# Patient Record
Sex: MALE | Race: WHITE | NOT HISPANIC OR LATINO | Employment: FULL TIME | URBAN - METROPOLITAN AREA
[De-identification: names, ages, dates, MRNs, and addresses within clinical notes are randomized per-mention and may not be internally consistent; named-entity substitution may affect disease eponyms.]

---

## 2017-02-20 ENCOUNTER — ALLSCRIPTS OFFICE VISIT (OUTPATIENT)
Dept: OTHER | Facility: OTHER | Age: 57
End: 2017-02-20

## 2017-02-20 DIAGNOSIS — I10 ESSENTIAL (PRIMARY) HYPERTENSION: ICD-10-CM

## 2017-02-20 DIAGNOSIS — Z12.5 ENCOUNTER FOR SCREENING FOR MALIGNANT NEOPLASM OF PROSTATE: ICD-10-CM

## 2017-02-20 DIAGNOSIS — E78.5 HYPERLIPIDEMIA: ICD-10-CM

## 2017-09-29 ENCOUNTER — ALLSCRIPTS OFFICE VISIT (OUTPATIENT)
Dept: OTHER | Facility: OTHER | Age: 57
End: 2017-09-29

## 2017-10-27 NOTE — PROGRESS NOTES
Assessment  1  Dizzy (780 4) (R42)   2  Essential hypertension (401 9) (I10)   3  Hyperlipidemia (272 4) (E78 5)    Plan  Dizzy, Essential hypertension, Hyperlipidemia    · (1) CBC/PLT/DIFF; Status:Active; Requested HJS:[id number];    · (1) COMPREHENSIVE METABOLIC PANEL; Status:Active; Requested ZZI:[id number];     Discussion/Summary    He will stop amlodipine for a few days and continue to monitor his blood pressure  He will let us know how he feels without amlodipine  He may restart this at half dose if his blood pressure goes above 150  Chief Complaint  Patient is in the office today complaining of having low blood pressure the past few weeks and he would like to know does it have anything to do with the medications he is taking      History of Present Illness  HPI: Patient comes in because of lightheadedness over the last 2 weeks  He has been checking his blood pressure and has been running in the low 100s  He recently received a new prescription for amlodipine from his mail order pharmacy in the pill looks different  Review of Systems    Constitutional: no fever or chills, feels well, no tiredness, no recent weight loss or weight gain  Cardiovascular: no complaints of slow or fast heart rate, no chest pain, no palpitations, no leg claudication or lower extremity edema  Respiratory: no complaints of shortness of breath, no wheezing or cough, no dyspnea on exertion, no orthopnea or PND  Active Problems  1  Arthropathy (716 90) (M12 9)   2  Essential hypertension (401 9) (I10)   3  GERD (gastroesophageal reflux disease) (530 81) (K21 9)   4  Hyperlipidemia (272 4) (E78 5)   5  Left upper quadrant pain (789 02) (R10 12)   6  Need for influenza vaccination (V04 81) (Z23)   7  Numbness Of The Right Arm (782 0)   8  Numbness Of The Right Leg (782 0)   9  Prostate cancer screening (V76 44) (Z12 5)   10  Screening for colorectal cancer (V76 51) (Z12 11,Z12 12)   11   Visit for preventive health examination (V70 0) (Z00 00)    Past Medical History  1  History of essential hypertension (V12 59) (Z86 79)   2  History of hyperlipidemia (V12 29) (Z86 39)   3  Visit for preventive health examination (V70 0) (Z00 00)  Active Problems And Past Medical History Reviewed: The active problems and past medical history were reviewed and updated today  Family History  Mother    1  Family history of Hypertension  Father    2  Family history of Arthritis   3  Denied: Family history of mental disorder   4  Denied: Family history of substance abuse   5  Family history of Hypercholesterolemia   6  Family history of Hypertension    Social History   · Always uses seat belt   · Being A Social Drinker   · Daily caffeine consumption   · Does not use illicit drugs (Y28 13) (Z78 9)   · Employed   ·    · Never A Smoker   · Seeing a dentist    Current Meds   1  AmLODIPine Besylate 5 MG Oral Tablet; TAKE 1 TABLET DAILY AS DIRECTED    Requested for: 72Khl4382; Last Rx:36Isb1333 Ordered   2  Aspirin 81 MG TABS; Take 2 tabs daily Recorded   3  Simvastatin 20 MG Oral Tablet; TAKE 1 TABLET DAILY; Last Rx:41Onc1546 Ordered   4  Valsartan-Hydrochlorothiazide 160-25 MG Oral Tablet; take one tablet by mouth every   day; Therapy: 73HRQ2420 to (Florentin Gresham)  Requested for: 94Ctu5741; Last   Rx:74Hag9649 Ordered    The medication list was reviewed and updated today  Allergies  1  Diovan TABS   2  Lisinopril TABS   3  Penicillins    Vitals   ** Printed in Appendix #1 below  Physical Exam    Constitutional   General appearance: No acute distress, well appearing and well nourished  Eyes   Conjunctiva and lids: No swelling, erythema, or discharge  Pupils and irises: Equal, round and reactive to light  Ears, Nose, Mouth, and Throat   External inspection of ears and nose: Normal     Otoscopic examination: Tympanic membrance translucent with normal light reflex  Canals patent without erythema      Oropharynx: Normal with no erythema, edema, exudate or lesions  Pulmonary   Respiratory effort: No increased work of breathing or signs of respiratory distress  Auscultation of lungs: Clear to auscultation, equal breath sounds bilaterally, no wheezes, no rales, no rhonci  Cardiovascular   Auscultation of heart: Normal rate and rhythm, normal S1 and S2, without murmurs  Examination of extremities for edema and/or varicosities: Normal     Carotid pulses: Normal     Abdomen   Abdomen: Non-tender, no masses  Lymphatic   Palpation of lymph nodes in neck: No lymphadenopathy  Musculoskeletal   Gait and station: Normal     Digits and nails: Normal without clubbing or cyanosis      Inspection/palpation of joints, bones, and muscles: Normal     Psychiatric   Orientation to person, place and time: Normal     Mood and affect: Normal          Signatures   Electronically signed by : COLLEEN Lorenz ; Sep 29 2017  4:25PM EST                       (Author)    Appendix #1     Patient: Mena Green; : 1960; MRN: [id number]      Recorded: 62PSW2486 04:03PM Recorded: 52BCC3545 04:02PM Recorded: 32VEO2322 03:47PM   Temperature   98 2 F   Heart Rate   80   Respiration   14   Systolic 304, Standing 580, Sitting 326   Diastolic 70, Standing 72, Sitting 74   Height   5 ft 11 in   Weight   206 lb    BMI Calculated   28 73   BSA Calculated   2 14   O2 Saturation   92

## 2017-11-10 ENCOUNTER — ALLSCRIPTS OFFICE VISIT (OUTPATIENT)
Dept: OTHER | Facility: OTHER | Age: 57
End: 2017-11-10

## 2017-11-10 LAB
BILIRUB UR QL STRIP: NEGATIVE
CLARITY UR: NORMAL
COLOR UR: CLEAR
GLUCOSE (HISTORICAL): NEGATIVE
HGB UR QL STRIP.AUTO: NEGATIVE
KETONES UR STRIP-MCNC: NEGATIVE MG/DL
LEUKOCYTE ESTERASE UR QL STRIP: NEGATIVE
NITRITE UR QL STRIP: NEGATIVE
PH UR STRIP.AUTO: 7 [PH]
PROT UR STRIP-MCNC: NEGATIVE MG/DL
SP GR UR STRIP.AUTO: 1.01
UROBILINOGEN UR QL STRIP.AUTO: 0.2

## 2017-11-11 ENCOUNTER — GENERIC CONVERSION - ENCOUNTER (OUTPATIENT)
Dept: OTHER | Facility: OTHER | Age: 57
End: 2017-11-11

## 2017-11-11 NOTE — PROGRESS NOTES
Assessment    1  Dysuria (788 1) (R30 0)   2  Urinary frequency (788 41) (R35 0)   3  Alopecia (704 00) (L65 9)   4  Essential hypertension (401 9) (I10)   5  Hyperlipidemia (272 4) (E78 5)    Plan   Dysuria    · Doxycycline Hyclate 100 MG Oral Capsule; TAKE 1 CAPSULE TWICE DAILYUNTIL GONE  Urine Dip Non-Automated- POC; Status:Resulted - Requires Verification,Retrospective Authorization;   Done: 65FUX9094 12:00AM Due:08Xua3553; Last Updated By:Ruperto Torres; 11/10/2017 4:09:41 PM;Ordered; Raissa Ogden; Ordered By:Zia Sommer; Discussion/Summary    We are getting urine culture we will call with results  We also called results of recent blood work including TSH to look into his hair loss  I have recommend he try off simvastatin for 1 month to see if his hair loss problem improves  Chief Complaint  Patient is in the office today for a possible UTI  Patient states that he has painful and frequent urination, and he also states that he feels as though he has a fever and is have back discomfort  History of Present Illness  HPI: Patient comes in with a 24 hour history of dysuria which actually has improved today  He also complains of a 2 month history of hair loss  He also stop taking amlodipine still has good blood pressure readings at home  Review of Systems   Constitutional: no fever-- and-- no chills  ENT: no complaints of earache, no loss of hearing, no nosebleeds or nasal discharge, no sore throat or hoarseness  Cardiovascular: no complaints of slow or fast heart rate, no chest pain, no palpitations, no leg claudication or lower extremity edema  Respiratory: no complaints of shortness of breath, no wheezing or cough, no dyspnea on exertion, no orthopnea or PND  Active Problems  1  Alopecia (704 00) (L65 9)   2  Arthropathy (716 90) (M12 9)   3  Dizzy (780 4) (R42)   4  Essential hypertension (401 9) (I10)   5  GERD (gastroesophageal reflux disease) (530 81) (K21 9)   6   Hyperlipidemia (272  4) (E78 5)   7  Left upper quadrant pain (789 02) (R10 12)   8  Need for influenza vaccination (V04 81) (Z23)   9  Numbness Of The Right Arm (782 0)   10  Numbness Of The Right Leg (782 0)   11  Prostate cancer screening (V76 44) (Z12 5)   12  Screening for colorectal cancer (V76 51) (Z12 11,Z12 12)   13  Visit for preventive health examination (V70 0) (Z00 00)    Past Medical History  1  History of essential hypertension (V12 59) (Z86 79)   2  History of hyperlipidemia (V12 29) (Z86 39)   3  Visit for preventive health examination (V70 0) (Z00 00)  Active Problems And Past Medical History Reviewed: The active problems and past medical history were reviewed and updated today  Family History  Mother    1  Family history of Hypertension  Father    2  Family history of Arthritis   3  Denied: Family history of mental disorder   4  Denied: Family history of substance abuse   5  Family history of Hypercholesterolemia   6  Family history of Hypertension    Social History     · Always uses seat belt   · Being A Social Drinker   · Daily caffeine consumption   · Does not use illicit drugs (S43 51) (Z78 9)   · Employed   ·    · Never A Smoker   · Seeing a dentist    Current Meds   1  Aspirin 81 MG TABS; Take 2 tabs daily Recorded   2  Simvastatin 20 MG Oral Tablet; TAKE 1 TABLET DAILY; Last Rx:81Rkq7291 Ordered   3  Valsartan-Hydrochlorothiazide 160-25 MG Oral Tablet; take one tablet by mouth every day; Therapy: 31PUP7325 to (Kirsten Campos)  Requested for: 83Slw9139; Last Rx:54Smo6861 Ordered    The medication list was reviewed and updated today  Allergies  1  Diovan TABS   2  Lisinopril TABS   3   Penicillins    Vitals   Recorded: 43HFN6091 02:25PM   Temperature 97 4 F   Heart Rate 85   Respiration 16   Systolic 788   Diastolic 72   Height 5 ft 11 in   Weight 208 lb 6 oz   BMI Calculated 29 06   BSA Calculated 2 15   O2 Saturation 97       Physical Exam   Constitutional  General appearance: No acute distress, well appearing and well nourished  Eyes  Conjunctiva and lids: No swelling, erythema, or discharge  Pupils and irises: Equal, round and reactive to light  Ears, Nose, Mouth, and Throat  External inspection of ears and nose: Normal    Oropharynx: Normal with no erythema, edema, exudate or lesions  Pulmonary  Respiratory effort: No increased work of breathing or signs of respiratory distress  Auscultation of lungs: Clear to auscultation, equal breath sounds bilaterally, no wheezes, no rales, no rhonci  Cardiovascular  Auscultation of heart: Normal rate and rhythm, normal S1 and S2, without murmurs  Examination of extremities for edema and/or varicosities: Normal    Abdomen  Abdomen: Non-tender, no masses  Liver and spleen: No hepatomegaly or splenomegaly  Lymphatic  Palpation of lymph nodes in neck: No lymphadenopathy  Musculoskeletal  Gait and station: Normal    Digits and nails: Normal without clubbing or cyanosis  Inspection/palpation of joints, bones, and muscles: Normal    Skin  Skin and subcutaneous tissue: Abnormal  -- Mild thinning of hair of frontal area  Neurologic  Cranial nerves: Cranial nerves 2-12 intact  Reflexes: 2+ and symmetric  Sensation: No sensory loss     Psychiatric  Orientation to person, place and time: Normal    Mood and affect: Normal          Signatures   Electronically signed by : COLLEEN Cheema ; Nov 10 2017  5:27PM EST                       (Author)

## 2017-11-12 LAB
BACTERIA UR QL AUTO: NORMAL
BILIRUB UR QL STRIP: NEGATIVE
COLOR UR: YELLOW
COMMENT (HISTORICAL): CLEAR
FECAL OCCULT BLOOD DIAGNOSTIC (HISTORICAL): NEGATIVE
GLUCOSE (HISTORICAL): NEGATIVE
KETONES UR STRIP-MCNC: NEGATIVE MG/DL
LEUKOCYTE ESTERASE UR QL STRIP: NEGATIVE
MICROSCOPIC EXAMINATION (HISTORICAL): NORMAL
MICROSCOPIC EXAMINATION (HISTORICAL): NORMAL
MUCUS THREADS (HISTORICAL): PRESENT
NITRITE UR QL STRIP: NEGATIVE
NON-SQ EPI CELLS URNS QL MICRO: NORMAL /HPF
PH UR STRIP.AUTO: 7 [PH] (ref 5–7.5)
PROT UR STRIP-MCNC: NEGATIVE MG/DL
RBC (HISTORICAL): NORMAL /HPF
SP GR UR STRIP.AUTO: 1.01 (ref 1–1.03)
URINALYSIS (UA) (HISTORICAL): NORMAL
UROBILINOGEN UR QL STRIP.AUTO: 0.2 EU/DL (ref 0.2–1)
WBC # BLD AUTO: NORMAL /HPF

## 2017-11-18 ENCOUNTER — GENERIC CONVERSION - ENCOUNTER (OUTPATIENT)
Dept: OTHER | Facility: OTHER | Age: 57
End: 2017-11-18

## 2017-11-19 LAB
T3FREE SERPL-MCNC: 110 NG/DL (ref 71–180)
T4 FREE SERPL-MCNC: 1.4 NG/DL (ref 0.82–1.77)
TSH SERPL DL<=0.05 MIU/L-ACNC: 5.47 UIU/ML (ref 0.45–4.5)

## 2018-01-11 NOTE — PROGRESS NOTES
Assessment    1  Encounter for preventive health examination (V70 0) (Z00 00)   2  Essential hypertension (401 9) (I10)   3  Hyperlipidemia (272 4) (E78 5)    Plan   Essential hypertension    · AmLODIPine Besylate 5 MG Oral Tablet; TAKE 1 TABLET DAILY AS DIRECTED  Essential hypertension, Hyperlipidemia    · (1) CBC/PLT/DIFF; Status:Active; Requested for:94Yht2180;    · (1) COMPREHENSIVE METABOLIC PANEL; Status:Active; Requested for:81Kzd9558;    · (1) LIPID PANEL, FASTING; Status:Active; Requested for:23Ime7640;   Essential hypertension, Hyperlipidemia, Prostate cancer screening    · (1) PSA (SCREEN) (Dx V76 44 Screen for Prostate Cancer); Status:Active; Requested  for:11Sww6814;   Hyperlipidemia    · Simvastatin 20 MG Oral Tablet; TAKE 1 TABLET DAILY    Follow-up visit in 1 year Evaluation and Treatment  Follow-up  Status: Hold For - Scheduling  Requested for: 35Ukp4702  Ordered; For: Health Maintenance;  Ordered By: Stephen Areas  Performed:   Due: 41VBN1891     Discussion/Summary  Impression: health maintenance visit  Currently, he has an adequate exercise regimen  Prostate cancer screening: PSA was ordered  Colorectal cancer screening: colorectal cancer screening is current  Advice and education were given regarding aerobic exercise and weight loss  Self Referrals: No      Chief Complaint  Well visit  History of Present Illness  HM, Adult Male: The patient is being seen for a health maintenance evaluation  The last health maintenance visit was 1 year(s) ago  Social History: Household members include spouse  He is   Work status: working full time  The patient has never smoked cigarettes  He reports occasional alcohol use  General Health: The patient's health since the last visit is described as good  Lifestyle:  He does not exercise regularly  He does not use tobacco  He consumes alcohol  He denies drug use  Screening: cancer screening reviewed and current     metabolic screening reviewed and current  risk screening reviewed and current  HPI: Patient comes in for yearly checkup  Review of Systems    Constitutional: No fever or chills, feels well, no tiredness, no recent weight gain or weight loss  Cardiovascular: No complaints of slow heart rate, no fast heart rate, no chest pain, no palpitations, no leg claudication, no lower extremity  Respiratory: No complaints of shortness of breath, no wheezing, no cough, no SOB on exertion, no orthopnea or PND  Gastrointestinal: No complaints of abdominal pain, no constipation, no nausea or vomiting, no diarrhea or bloody stools  Active Problems    1  Arthropathy (716 90) (M12 9)   2  Essential hypertension (401 9) (I10)   3  GERD (gastroesophageal reflux disease) (530 81) (K21 9)   4  Hyperlipidemia (272 4) (E78 5)   5  Left upper quadrant pain (789 02) (R10 12)   6  Numbness Of The Right Arm (782 0)   7  Numbness Of The Right Leg (782 0)   8  Visit for preventive health examination (V70 0) (Z00 00)    Past Medical History    · History of essential hypertension (V12 59) (Z86 79)   · History of hyperlipidemia (V12 29) (Z86 39)   · Visit for preventive health examination (V70 0) (Z00 00)    Family History  Mother    · Family history of Hypertension  Father    · Family history of Arthritis   · Family history of Hypercholesterolemia   · Family history of Hypertension    Social History    · Always uses seat belt   · Being A Social Drinker   · Daily caffeine consumption   · Does not use illicit drugs (K97 25) (Z78 9)   · Employed   ·    · Never A Smoker   · Seeing a dentist    Current Meds   1  AmLODIPine Besylate 5 MG Oral Tablet; TAKE 1 TABLET DAILY AS DIRECTED    Requested for: 06YUA1421; Last Rx:09Jan2016 Ordered   2  Aspirin 81 MG TABS; Take 2 tabs daily Recorded   3  Simvastatin 20 MG Oral Tablet; TAKE 1 TABLET DAILY; Last Rx:09Jan2016 Ordered   4  Valsartan-Hydrochlorothiazide 160-25 MG Oral Tablet;  Take 1 tablet daily; Therapy: 62MHM8147 to (Evaluate:17Mar2017)  Requested for: 03Bim8566; Last   Rx:31Cmc3422 Ordered    Allergies    1  Diovan TABS   2  Lisinopril TABS   3  Penicillins    Vitals   Recorded: 50Vxh1169 06:01PM   Heart Rate 73   Systolic 361   Diastolic 70   Height 5 ft 11 in   Weight 207 lb    BMI Calculated 28 87   BSA Calculated 2 13   O2 Saturation 98     Physical Exam    Constitutional   General appearance: No acute distress, well appearing and well nourished  Head and Face   Head and face: Normal     Eyes   Conjunctiva and lids: No erythema, swelling or discharge  Pupils and irises: Equal, round, reactive to light  Ears, Nose, Mouth, and Throat   External inspection of ears and nose: Normal     Otoscopic examination: Tympanic membranes translucent with normal light reflex  Canals patent without erythema  Hearing: Normal     Oropharynx: Normal with no erythema, edema, exudate or lesions  Neck   Neck: Supple, symmetric, trachea midline, no masses  Thyroid: Normal, no thyromegaly  Pulmonary   Respiratory effort: No increased work of breathing or signs of respiratory distress  Auscultation of lungs: Clear to auscultation  Cardiovascular   Auscultation of heart: Normal rate and rhythm, normal S1 and S2, no murmurs  Carotid pulses: 2+ bilaterally  Abdominal aorta: Normal     Chest   Chest: Normal     Abdomen   Abdomen: Non-tender, no masses  Liver and spleen: No hepatomegaly or splenomegaly  Lymphatic   Palpation of lymph nodes in neck: No lymphadenopathy  Palpation of lymph nodes in other areas: No lymphadenopathy  Musculoskeletal   Gait and station: Normal     Inspection/palpation of digits and nails: Normal without clubbing or cyanosis  Inspection/palpation of joints, bones, and muscles: Normal     Skin   Palpation of skin and subcutaneous tissue: Normal turgor  Neurologic   Cranial nerves: Cranial nerves 2-12 intact  Reflexes: 2+ and symmetric      Sensation: No sensory loss      Psychiatric   Judgment and insight: Normal     Orientation to person, place and time: Normal     Mood and affect: Normal        Signatures   Electronically signed by : COLLEEN Mccarthy ; Feb 20 2017  6:20PM EST                       (Author)

## 2018-01-13 VITALS
HEIGHT: 71 IN | RESPIRATION RATE: 14 BRPM | OXYGEN SATURATION: 92 % | HEART RATE: 80 BPM | WEIGHT: 206 LBS | SYSTOLIC BLOOD PRESSURE: 112 MMHG | DIASTOLIC BLOOD PRESSURE: 70 MMHG | BODY MASS INDEX: 28.84 KG/M2 | TEMPERATURE: 98.2 F

## 2018-01-14 VITALS
WEIGHT: 207 LBS | SYSTOLIC BLOOD PRESSURE: 110 MMHG | OXYGEN SATURATION: 98 % | BODY MASS INDEX: 28.98 KG/M2 | DIASTOLIC BLOOD PRESSURE: 70 MMHG | HEIGHT: 71 IN | HEART RATE: 73 BPM

## 2018-01-14 VITALS
OXYGEN SATURATION: 97 % | SYSTOLIC BLOOD PRESSURE: 118 MMHG | HEIGHT: 71 IN | TEMPERATURE: 97.4 F | RESPIRATION RATE: 16 BRPM | DIASTOLIC BLOOD PRESSURE: 72 MMHG | WEIGHT: 208.38 LBS | BODY MASS INDEX: 29.17 KG/M2 | HEART RATE: 85 BPM

## 2018-01-16 NOTE — PROGRESS NOTES
Assessment    1  Essential hypertension (401 9) (I10)   2  Hyperlipidemia (272 4) (E78 5)   3  Left upper quadrant pain (789 02) (R10 12)    Plan  Essential hypertension    · AmLODIPine Besylate 5 MG Oral Tablet; TAKE 1 TABLET DAILY AS DIRECTED   · Triamterene-HCTZ 37 5-25 MG Oral Capsule (Dyazide); TAKE 1 CAPSULE Daily  Health Maintenance    · Follow-up visit in 1 year Evaluation and Treatment  Follow-up  Status: Complete  Done:  28SJB8968  Health Maintenance, Hyperlipidemia    · (1) CBC/PLT/DIFF; Status:Active; Requested UJK:94KTA6279;    · (1) COMPREHENSIVE METABOLIC PANEL; Status:Active; Requested WTJ:28AHD4316;    · (1) LIPID PANEL, FASTING; Status:Active; Requested UI61CKP1042;   the patient been fasting for 10-12 hours? : Yes   · (1) PSA (SCREEN) (Dx V76 44 Screen for Prostate Cancer); Status:Active; Requested  UFK:74FCK9613;   Hyperlipidemia    · Simvastatin 20 MG Oral Tablet; TAKE 1 TABLET DAILY    Discussion/Summary    Follow up with GI regarding his abdominal pain  The treatment plan was reviewed with the patient/guardian  The patient/guardian understands and agrees with the treatment plan      Chief Complaint  Pt is here for a follow up, Check finger from post infection      History of Present Illness  Patient comes in for yearly checkup  He is seeing GI for left upper part of abdominal pain and bloating  He had normal colonoscopy last week  He monitors blood pressure at home  Review of Systems    Constitutional: No fever or chills, feels well, no tiredness, no recent weight gain or weight loss  Cardiovascular: No complaints of slow heart rate, no fast heart rate, no chest pain, no palpitations, no leg claudication, no lower extremity  Respiratory: No complaints of shortness of breath, no wheezing, no cough, no SOB on exertion, no orthopnea or PND  Gastrointestinal: as noted in HPI  Active Problems    1  Arthropathy (716 90) (M12 9)   2  Essential hypertension (401 9) (I10)   3  GERD (gastroesophageal reflux disease) (530 81) (K21 9)   4  Hyperlipidemia (272 4) (E78 5)   5  Numbness Of The Right Arm (782 0)   6  Numbness Of The Right Leg (782 0)   7  Visit for preventive health examination (V70 0) (Z00 00)    Past Medical History    1  History of essential hypertension (V12 59) (Z86 79)   2  History of hyperlipidemia (V12 29) (Z86 39)   3  Visit for preventive health examination (V70 0) (Z00 00)    Family History    1  Family history of Hypertension    2  Family history of Arthritis   3  Family history of Hypercholesterolemia   4  Family history of Hypertension    The family history was reviewed and updated today  Social History    · Being A Social Drinker   · Never A Smoker  The social history was reviewed and updated today  The social history was reviewed and is unchanged  Current Meds   1  AmLODIPine Besylate 5 MG Oral Tablet; TAKE 1 TABLET DAILY AS DIRECTED    Requested for: 20HKJ1718; Last OP:[id number] Ordered   2  Aspirin 81 MG Oral Tablet; Take 2 tabs daily Recorded   3  Dyazide 37 5-25 MG Oral Capsule; TAKE 1 CAPSULE Daily Recorded   4  Simvastatin 20 MG Oral Tablet; TAKE 1 TABLET DAILY; Last YA:06DMQ2589 Ordered    The medication list was reviewed and updated today  Allergies    1  Diovan TABS   2  Lisinopril TABS   3  Penicillins    Vitals  Vital Signs [Data Includes: Current Encounter]    Recorded: 39UAZ6732 09:23AM   Heart Rate 71   Systolic 016   Diastolic 64   Height 5 ft 11 in   Weight 204 lb    BMI Calculated 28 45   BSA Calculated 2 12   O2 Saturation 98     Physical Exam    Constitutional   General appearance: No acute distress, well appearing and well nourished  Eyes   Conjunctiva and lids: No swelling, erythema, or discharge  Pupils and irises: Equal, round and reactive to light  Ears, Nose, Mouth, and Throat   External inspection of ears and nose: Normal     Otoscopic examination: Tympanic membrance translucent with normal light reflex   Canals patent without erythema  Oropharynx: Normal with no erythema, edema, exudate or lesions  Pulmonary   Respiratory effort: No increased work of breathing or signs of respiratory distress  Auscultation of lungs: Clear to auscultation, equal breath sounds bilaterally, no wheezes, no rales, no rhonci  Cardiovascular   Auscultation of heart: Normal rate and rhythm, normal S1 and S2, without murmurs  Examination of extremities for edema and/or varicosities: Normal     Carotid pulses: Normal     Abdomen   Abdomen: Non-tender, no masses  Liver and spleen: No hepatomegaly or splenomegaly  Lymphatic   Palpation of lymph nodes in neck: No lymphadenopathy  Musculoskeletal   Gait and station: Normal     Digits and nails: Normal without clubbing or cyanosis  Inspection/palpation of joints, bones, and muscles: Normal     Skin   Skin and subcutaneous tissue: Normal without rashes or lesions  Neurologic   Cranial nerves: Cranial nerves 2-12 intact  Reflexes: 2+ and symmetric  Sensation: No sensory loss      Psychiatric   Orientation to person, place and time: Normal     Mood and affect: Normal          Signatures   Electronically signed by : COLLEEN Escudero ; Jan 9 2016 10:53AM EST                       (Author)

## 2018-10-01 PROBLEM — I10 ESSENTIAL HYPERTENSION: Status: ACTIVE | Noted: 2018-04-19

## 2018-10-01 PROBLEM — E78.2 MIXED HYPERLIPIDEMIA: Status: ACTIVE | Noted: 2018-08-30

## 2018-10-01 PROBLEM — E04.1 THYROID NODULE: Status: ACTIVE | Noted: 2018-02-27

## 2018-10-01 PROBLEM — E03.9 ACQUIRED HYPOTHYROIDISM: Status: ACTIVE | Noted: 2018-02-27

## 2018-10-08 ENCOUNTER — OFFICE VISIT (OUTPATIENT)
Dept: INTERNAL MEDICINE CLINIC | Facility: CLINIC | Age: 58
End: 2018-10-08
Payer: COMMERCIAL

## 2018-10-08 VITALS
WEIGHT: 212 LBS | SYSTOLIC BLOOD PRESSURE: 136 MMHG | DIASTOLIC BLOOD PRESSURE: 88 MMHG | OXYGEN SATURATION: 96 % | BODY MASS INDEX: 31.4 KG/M2 | HEART RATE: 62 BPM | HEIGHT: 69 IN

## 2018-10-08 DIAGNOSIS — E66.9 OBESITY (BMI 30-39.9): ICD-10-CM

## 2018-10-08 DIAGNOSIS — I10 ESSENTIAL HYPERTENSION: ICD-10-CM

## 2018-10-08 DIAGNOSIS — E78.2 MIXED HYPERLIPIDEMIA: ICD-10-CM

## 2018-10-08 DIAGNOSIS — Z13.31 DEPRESSION SCREENING NEGATIVE: ICD-10-CM

## 2018-10-08 DIAGNOSIS — E03.9 ACQUIRED HYPOTHYROIDISM: Primary | ICD-10-CM

## 2018-10-08 DIAGNOSIS — Z71.3 DIETARY COUNSELING AND SURVEILLANCE: ICD-10-CM

## 2018-10-08 PROCEDURE — 1036F TOBACCO NON-USER: CPT | Performed by: INTERNAL MEDICINE

## 2018-10-08 PROCEDURE — 99214 OFFICE O/P EST MOD 30 MIN: CPT | Performed by: INTERNAL MEDICINE

## 2018-10-08 RX ORDER — IRBESARTAN 75 MG/1
75 TABLET ORAL
COMMUNITY
End: 2018-10-08 | Stop reason: SDUPTHER

## 2018-10-08 RX ORDER — MULTIVITAMIN
1 TABLET ORAL DAILY
COMMUNITY

## 2018-10-08 RX ORDER — IRBESARTAN 75 MG/1
75 TABLET ORAL
Qty: 30 TABLET | Refills: 0 | Status: SHIPPED | OUTPATIENT
Start: 2018-10-08 | End: 2018-11-10 | Stop reason: SDUPTHER

## 2018-10-08 NOTE — PROGRESS NOTES
INTERNAL MEDICINE INITIAL OFFICE VISIT  [facility] - [facility] [address] [place] [address]    NAME: Raheem Jennings  AGE: 62 y o  SEX: male  : 1960     DATE: 10/8/2018     Assessment and Plan:     1  Acquired hypothyroidism    Will repeat thyroid function testing within the next month  Based on those results will see if we need to adjust levothyroxine     - TSH, 3rd generation; Future  - T4, free; Future  - T3; Future    2  Essential hypertension    Blood pressure is stable  Continue avapro as prescribed  Discussed diet and exercise  Weight loss is encouraged  - irbesartan (AVAPRO) 75 mg tablet; Take 1 tablet (75 mg total) by mouth daily at bedtime  Dispense: 30 tablet; Refill: 0    3  Mixed hyperlipidemia    Continue statin  LDL goal <100  Will repeat lipid panel before next appt  - Lipid panel; Future    4  Obesity (BMI 30-39  9)    Patient's Body mass index is 31 08 kg/m²  Discussed the patient's BMI  The BMI is above average; BMI counseling and education was provided to the patient  General weight loss/lifestyle modification strategies discussed (elicit support from others; identify saboteurs; non-food rewards, etc)  Diet interventions: low carb vs atkins vs keto vs DASH diet discussed  Regular aerobic exercise program was recommended at least 3 times per week     Chief Complaint:     Chief Complaint   Patient presents with    Establish Care      History of Present Illness:     Patient presents to establish care  He has underlying hypertension, hyperlipidemia, hypothyroidism, and obesity  He presents with his wife  Has no acute concerns  Admits to not watching his diet and never exercises  His most recent TSH was elevated at 7 700  His previous PCP elected to monitor on current dose of levothyroxine for now  He has been able to lose weight when he tries to watch what he is eating  His valsartan was switched to irbesartan due to valsartan recall   He denies any current cardiac symptoms  He is tolerating statin well without myalgias  Does not want flu shot  The following portions of the patient's history were reviewed and updated as appropriate: allergies, current medications, past family history, past medical history, past social history, past surgical history and problem list      Review of Systems:     Review of Systems   Constitutional: Negative for appetite change, chills, fatigue and fever  HENT: Negative for congestion, hearing loss, postnasal drip, rhinorrhea, sore throat, tinnitus and trouble swallowing  Eyes: Negative for pain, discharge, redness and visual disturbance  Respiratory: Negative for cough, chest tightness, shortness of breath and wheezing  Cardiovascular: Negative for chest pain, palpitations and leg swelling  Gastrointestinal: Negative for abdominal distention, abdominal pain, blood in stool, constipation, diarrhea, nausea and vomiting  Endocrine: Negative for cold intolerance, heat intolerance, polydipsia, polyphagia and polyuria  Genitourinary: Negative for difficulty urinating, dysuria, frequency, hematuria and urgency  Musculoskeletal: Negative for arthralgias, back pain, gait problem, joint swelling, myalgias, neck pain and neck stiffness  Skin: Negative for color change and rash  Neurological: Negative for dizziness, tremors, seizures, syncope, speech difficulty, weakness, light-headedness, numbness and headaches  Hematological: Negative for adenopathy  Does not bruise/bleed easily  Psychiatric/Behavioral: Negative for agitation, behavioral problems, confusion, hallucinations, sleep disturbance and suicidal ideas  The patient is not nervous/anxious  Past Medical History:     Past Medical History:   Diagnosis Date    Essential hypertension     GERD (gastroesophageal reflux disease)     Hyperlipidemia     Hypothyroidism     Thyroid nodule       Past Surgical History:   History reviewed   No pertinent surgical history  Social History:   He reports that he has never smoked  He has never used smokeless tobacco  He reports that he drinks alcohol  He reports that he does not use drugs  Family History:     Family History   Problem Relation Age of Onset    Hypertension Mother     Arthritis Father     Hyperlipidemia Father     Hypertension Father     No Known Problems Sister       Current Medications:     Current Outpatient Prescriptions:     aspirin 81 MG tablet, Take 2 tablets by mouth daily, Disp: , Rfl:     irbesartan (AVAPRO) 75 mg tablet, Take 75 mg by mouth daily at bedtime, Disp: , Rfl:     levothyroxine 50 mcg tablet, TK 1 T PO D, Disp: , Rfl: 3    Multiple Vitamin (MULTIVITAMIN) tablet, Take 1 tablet by mouth daily, Disp: , Rfl:     simvastatin (ZOCOR) 20 mg tablet, Take 1 tablet by mouth daily, Disp: , Rfl:      Allergies: Allergies   Allergen Reactions    Lisinopril Cough    Penicillins       Physical Exam:     /88   Pulse 62   Ht 5' 9 25" (1 759 m)   Wt 96 2 kg (212 lb)   SpO2 96%   BMI 31 08 kg/m²     Physical Exam   Constitutional: He is oriented to person, place, and time  He appears well-developed and well-nourished  No distress  Obesity   Eyes: Conjunctivae are normal  Right eye exhibits no discharge  Left eye exhibits no discharge  No scleral icterus  Neck: Neck supple  No JVD present  No thyromegaly present  Cardiovascular: Normal rate, regular rhythm, normal heart sounds and intact distal pulses  Exam reveals no gallop and no friction rub  No murmur heard  Pulmonary/Chest: Effort normal and breath sounds normal  No respiratory distress  He has no wheezes  He has no rales  He exhibits no tenderness  Abdominal: Soft  Bowel sounds are normal  He exhibits no distension and no mass  There is no tenderness  There is no rebound and no guarding  Musculoskeletal: Normal range of motion  He exhibits no edema  Lymphadenopathy:     He has no cervical adenopathy  Neurological: He is alert and oriented to person, place, and time  Skin: Skin is warm and dry  He is not diaphoretic  Psychiatric: He has a normal mood and affect  His behavior is normal    Vitals reviewed       Data:     Laboratory Results: I have personally reviewed the pertinent laboratory results/reports     PHQ-9  Negative for depression with PHQ2 score of 0     Yoana Griffin DO  MEDICAL [id number]

## 2018-10-08 NOTE — PATIENT INSTRUCTIONS
Influenza Vaccine   AMBULATORY CARE:   The influenza vaccine  is an injection given to help prevent influenza (flu)  The flu is caused by a virus  The virus spreads from person to person through coughing and sneezing  Several types of viruses cause the flu  The viruses change over time, so new vaccines are made each year  The vaccine begins to protect you about 2 weeks after you get it  The flu shot usually injected into your upper arm  It may be given in your thigh  You may get a vaccine with a weak or dead virus  Call 911 for any of the following:   · Your mouth and throat are swollen  · You are wheezing or have trouble breathing  · You have chest pain or your heart is beating faster than normal for you  · You feel like you are going to faint  Seek care immediately if:   · Your face is red or swollen  · You have hives that spread over your body  · You feel weak or dizzy  Contact your healthcare provider if:   · You have increased pain, redness, or swelling around the area where the shot was given  · You have questions or concerns about the influenza vaccine  When to get the influenza vaccine: The influenza vaccine is offered every year starting in September or October  Get the influenza vaccine as soon as it is available  Children 6 months to 6years old need 2 doses during the first year they get the vaccine  The 2 doses should be given at least 4 weeks apart  It is best if the same type of vaccine is given both times  The child can then receive 1 dose each year  Children 9 years or older should get 1 dose each year          Who should get the flu shot:   · Infants 6 months or older    · Any healthy adult who would like to decrease the risk for the flu    · Anyone living with or caring for children younger than 5 years     · Healthcare workers    · Anyone who lives in a long-term care facility    · Anyone who has chronic health problems, such as asthma, diabetes, or blood disorders    · Anyone who has a weak immune system    · Women who are or will be pregnant during the flu season  Who should not get the flu shot:  If you have an egg allergy, ask your healthcare provider if it is safe to get the flu shot  You will need to be closely monitored by a healthcare provider while you receive the vaccine, and for an hour or more after  The following should not get the flu shot:  · Infants younger than 6 months     · Anyone who has had an allergic reaction to the flu shot    · Anyone who is sick or has a fever    · Anyone who received a diagnosis of Guillain-Barré syndrome within 6 weeks of getting a flu vaccine    · Anyone who is allergic to thimerosal (mercury)  Risks of the influenza vaccine: The flu shot may cause mild symptoms, such as a fever, headache, and muscle aches  It may also cause mild to moderate soreness or redness at the area where you were given the shot  The nasal spray may cause a fever, runny or stuffy nose, headache, muscle aches, or vomiting  You may still get the flu after you receive the influenza vaccine  If you are allergic to eggs, ask about an egg-free vaccine  You may have an allergic reaction to the vaccine  This can be life-threatening  Follow up with your healthcare provider as directed:  Write down your questions so you remember to ask them during your visits  © 2017 [address] [employer] Information is for End User's use only and may not be sold, redistributed or otherwise used for commercial purposes  All illustrations and images included in CareNotes® are the copyrighted property of A D [employer] M [employer]  or [employer] Crist[employer]  The above information is an  only  It is not intended as medical advice for individual conditions or treatments  Talk to your doctor, nurse or pharmacist before following any medical regimen to see if it is safe and effective for you

## 2018-11-10 DIAGNOSIS — I10 ESSENTIAL HYPERTENSION: ICD-10-CM

## 2018-11-10 RX ORDER — IRBESARTAN 75 MG/1
TABLET ORAL
Qty: 30 TABLET | Refills: 5 | Status: SHIPPED | OUTPATIENT
Start: 2018-11-10 | End: 2019-01-02 | Stop reason: SDUPTHER

## 2018-11-27 ENCOUNTER — TELEPHONE (OUTPATIENT)
Dept: INTERNAL MEDICINE CLINIC | Facility: CLINIC | Age: 58
End: 2018-11-27

## 2018-11-27 DIAGNOSIS — E03.9 ACQUIRED HYPOTHYROIDISM: Primary | ICD-10-CM

## 2018-11-27 LAB
CHOLEST SERPL-MCNC: 154 MG/DL (ref 100–199)
HDLC SERPL-MCNC: 50 MG/DL
LABCORP COMMENT: NORMAL
LDLC SERPL CALC-MCNC: 86 MG/DL (ref 0–99)
SL AMB VLDL CHOLESTEROL CALC: 18 MG/DL (ref 5–40)
T3 SERPL-MCNC: 97 NG/DL (ref 71–180)
T4 FREE SERPL-MCNC: 1.43 NG/DL (ref 0.82–1.77)
TRIGL SERPL-MCNC: 92 MG/DL (ref 0–149)
TSH SERPL DL<=0.005 MIU/L-ACNC: 5.21 UIU/ML (ref 0.45–4.5)

## 2018-11-27 RX ORDER — LEVOTHYROXINE SODIUM 0.07 MG/1
75 TABLET ORAL DAILY
Qty: 90 TABLET | Refills: 1 | Status: SHIPPED | OUTPATIENT
Start: 2018-11-27 | End: 2018-11-28 | Stop reason: SDUPTHER

## 2018-11-27 NOTE — TELEPHONE ENCOUNTER
----- Message from Brando Muñoz DO sent at 11/27/2018  9:55 AM EST -----  TSH slightly elevated still  Would like to increase levothyroxine to 75mcg daily

## 2018-11-28 ENCOUNTER — TELEPHONE (OUTPATIENT)
Dept: INTERNAL MEDICINE CLINIC | Facility: CLINIC | Age: 58
End: 2018-11-28

## 2018-11-28 DIAGNOSIS — E03.9 ACQUIRED HYPOTHYROIDISM: ICD-10-CM

## 2018-11-28 RX ORDER — LEVOTHYROXINE SODIUM 0.07 MG/1
75 TABLET ORAL DAILY
Qty: 90 TABLET | Refills: 1 | Status: SHIPPED | OUTPATIENT
Start: 2018-11-28 | End: 2019-07-25 | Stop reason: SDUPTHER

## 2018-11-28 RX ORDER — LEVOTHYROXINE SODIUM 0.07 MG/1
75 TABLET ORAL DAILY
Qty: 30 TABLET | Refills: 0 | Status: SHIPPED | OUTPATIENT
Start: 2018-11-28 | End: 2018-11-28 | Stop reason: SDUPTHER

## 2018-11-28 NOTE — TELEPHONE ENCOUNTER
PATIENT WENT TO [facility] LAST NIGHT AND DID NOT HAVE THE MONEY FOR HIS 80 DAY SUPPLY OF HIS LEVOTHYROXINE 75 MCG  HE IS REQUESTING A 30 DAY SUPPLY OF LEVOTHYROXINE 75 MCG TABLET SENT TO [facility] AND A 90 DAY SUPPLY THROUGH [facility]   PLEASE CONTACT PATIENT WITH ANY QUESTIONS   [phone]

## 2018-12-07 RX ORDER — SIMVASTATIN 20 MG
TABLET ORAL DAILY
Qty: 90 TABLET | Refills: 0 | OUTPATIENT
Start: 2018-12-07

## 2018-12-19 DIAGNOSIS — E78.2 MIXED HYPERLIPIDEMIA: Primary | ICD-10-CM

## 2018-12-19 RX ORDER — SIMVASTATIN 20 MG
20 TABLET ORAL DAILY
Qty: 90 TABLET | Refills: 3 | Status: SHIPPED | OUTPATIENT
Start: 2018-12-19 | End: 2019-10-07 | Stop reason: SDUPTHER

## 2019-01-02 ENCOUNTER — OFFICE VISIT (OUTPATIENT)
Dept: INTERNAL MEDICINE CLINIC | Facility: CLINIC | Age: 59
End: 2019-01-02
Payer: COMMERCIAL

## 2019-01-02 VITALS
WEIGHT: 217.4 LBS | DIASTOLIC BLOOD PRESSURE: 70 MMHG | SYSTOLIC BLOOD PRESSURE: 128 MMHG | HEART RATE: 77 BPM | OXYGEN SATURATION: 92 % | HEIGHT: 69 IN | BODY MASS INDEX: 32.2 KG/M2

## 2019-01-02 DIAGNOSIS — E03.9 ACQUIRED HYPOTHYROIDISM: ICD-10-CM

## 2019-01-02 DIAGNOSIS — K21.9 GASTROESOPHAGEAL REFLUX DISEASE WITHOUT ESOPHAGITIS: ICD-10-CM

## 2019-01-02 DIAGNOSIS — M25.50 ARTHRALGIA, UNSPECIFIED JOINT: ICD-10-CM

## 2019-01-02 DIAGNOSIS — I10 ESSENTIAL HYPERTENSION: Primary | ICD-10-CM

## 2019-01-02 DIAGNOSIS — R53.83 FATIGUE, UNSPECIFIED TYPE: ICD-10-CM

## 2019-01-02 PROCEDURE — 3078F DIAST BP <80 MM HG: CPT | Performed by: INTERNAL MEDICINE

## 2019-01-02 PROCEDURE — 99214 OFFICE O/P EST MOD 30 MIN: CPT | Performed by: INTERNAL MEDICINE

## 2019-01-02 PROCEDURE — 1036F TOBACCO NON-USER: CPT | Performed by: INTERNAL MEDICINE

## 2019-01-02 PROCEDURE — 3008F BODY MASS INDEX DOCD: CPT | Performed by: INTERNAL MEDICINE

## 2019-01-02 PROCEDURE — 3074F SYST BP LT 130 MM HG: CPT | Performed by: INTERNAL MEDICINE

## 2019-01-02 RX ORDER — IRBESARTAN 150 MG/1
150 TABLET ORAL
Qty: 90 TABLET | Refills: 1 | Status: SHIPPED | OUTPATIENT
Start: 2019-01-02 | End: 2019-02-19

## 2019-01-02 RX ORDER — PANTOPRAZOLE SODIUM 40 MG/1
40 TABLET, DELAYED RELEASE ORAL DAILY
Qty: 30 TABLET | Refills: 0 | Status: SHIPPED | OUTPATIENT
Start: 2019-01-02 | End: 2019-12-03 | Stop reason: SDUPTHER

## 2019-01-02 NOTE — PATIENT INSTRUCTIONS
Gastroesophageal Reflux Disease   AMBULATORY CARE:   Gastroesophageal reflux  reflux occurs when acid and food in the stomach back up into the esophagus  Gastroesophageal reflux disease (GERD) is reflux that occurs more than twice a week for a few weeks  It usually causes heartburn and other symptoms  GERD can cause other health problems over time if it is not treated  Common symptoms include:  Heartburn is the most common symptom of GERD  You may feel burning pain in your chest or below the breast bone  This usually occurs after meals and spreads to your neck, jaw, or shoulder  The pain gets better when you change positions  You may also have any of the following:  · Bitter or acid taste in your mouth    · Dry cough    · Trouble swallowing or pain with swallowing    · Hoarseness or sore throat    · Frequent burping or hiccups    · Feeling of fullness soon after you start eating  Seek care immediately if:  · You feel full and cannot burp or vomit  · You have severe chest pain and sudden trouble breathing  · Your bowel movements are black, bloody, or tarry-looking  · Your vomit looks like coffee grounds or has blood in it  Contact your healthcare provider if:   · You vomit large amounts, or you vomit often  · You have trouble breathing after you vomit  · You have trouble swallowing, or pain with swallowing  · You are losing weight without trying  · Your symptoms get worse or do not improve with treatment  · You have questions or concerns about your condition or care  Treatment for GERD:  Your healthcare provider may prescribe medicine to decrease stomach acid  He may also prescribe medicine that help your esophagus and stomach move food and liquid to your intestines  Surgery may be done if other treatments do not work  You may need surgery to wrap the upper part of the stomach around the esophageal sphincter  This will strengthen the sphincter and prevent reflux     Manage GERD: · Do not have foods or drinks that may increase heartburn  These include chocolate, peppermint, fried or fatty foods, drinks that contain caffeine, or carbonated drinks (soda)  Other foods include spicy foods, onions, tomatoes, and tomato-based foods  Do not have foods or drinks that can irritate your esophagus, such as citrus fruits, juices, and alcohol  · Do not eat large meals  When you eat a lot of food at one time, your stomach needs more acid to digest it  Eat 6 small meals each day instead of 3 large ones, and eat slowly  Do not eat meals 2 to 3 hours before bedtime  · Elevate the head of your bed  Place 6-inch blocks under the head of your bed frame  You may also use more than one pillow under your head and shoulders while you sleep  · Maintain a healthy weight  If you are overweight, weight loss may help relieve symptoms of GERD  · Do not smoke  Smoking weakens the lower esophageal sphincter and increases the risk of GERD  Ask your healthcare provider for information if you currently smoke and need help to quit  E-cigarettes or smokeless tobacco still contain nicotine  Talk to your healthcare provider before you use these products  · Do not wear clothing that is tight around your waist   Tight clothing can put pressure on your stomach and cause or worsen GERD symptoms  Follow up with your healthcare provider as directed:  Write down your questions so you remember to ask them during your visits  © 2017 [facility] Information is for End User's use only and may not be sold, redistributed or otherwise used for commercial purposes  All illustrations and images included in CareNotes® are the copyrighted property of A D A M , Inc  or [employer]  The above information is an  only  It is not intended as medical advice for individual conditions or treatments   Talk to your doctor, nurse or pharmacist before following any medical regimen to see if it is safe and effective for you

## 2019-01-02 NOTE — PROGRESS NOTES
INTERNAL MEDICINE FOLLOW-UP OFFICE VISIT  [facility] - [facility]    NAME: Alfonso Palomo  AGE: 62 y o  SEX: male  : 1960     DATE: 2019     Assessment and Plan:     1  Essential hypertension    Increase avapro to 150 mg daily  Heart healthy diet  Increase exercise  BMI Counseling: Body mass index is 31 87 kg/m²  Discussed the patient's BMI with him  The BMI is above average  BMI counseling and education was provided to the patient  Nutrition recommendations include reducing portion sizes, decreasing overall calorie intake and 3-5 servings of fruits/vegetables daily  Exercise recommendations include moderate aerobic physical activity for 150 minutes/week and exercising 3-5 times per week  - irbesartan (AVAPRO) 150 mg tablet; Take 1 tablet (150 mg total) by mouth daily at bedtime  Dispense: 90 tablet; Refill: 1    2  Gastroesophageal reflux disease without esophagitis    Recommend 2-4 week course of PPI  - pantoprazole (PROTONIX) 40 mg tablet; Take 1 tablet (40 mg total) by mouth daily  Dispense: 30 tablet; Refill: 0    3  Arthralgia, unspecified joint  4  Fatigue, unspecified type    No joint swelling on exam  Will check lyme antibody profile  - Lyme Antibody Profile with reflex to WB; Future    5  Acquired hypothyroidism    Repeat TSH in 2 weeks as dose was recently increased  - TSH, 3rd generation; Future    Return in about 6 months (around 2019) for Follow-up  Chief Complaint:     Chief Complaint   Patient presents with    Hypertension     out of control      History of Present Illness:     Patient has been monitoring his blood pressure at home and has had some spikes in the systolic range between 396X to 150s  Patient has been having some increased indigestion and pains in his joints with should been worrying him  Patient is not sure if anxiety has been raising his blood pressure    Patient noted some increased headaches when his blood pressure is high  Patient denies any vision changes, chest pain, shortness of breath  Patient is concerned with his migratory joint pain that he could have underlying Lyme  He denies any joint swelling  The following portions of the patient's history were reviewed and updated as appropriate: allergies, current medications, past family history, past medical history, past social history, past surgical history and problem list      Review of Systems:     Review of Systems   Constitutional: Negative for activity change, appetite change and fatigue  Respiratory: Negative for apnea, cough, chest tightness, shortness of breath and wheezing  Cardiovascular: Negative for chest pain, palpitations and leg swelling  Gastrointestinal: Negative for abdominal distention, abdominal pain, blood in stool, constipation, diarrhea, nausea and vomiting  GERD   Musculoskeletal: Positive for arthralgias  Negative for back pain, gait problem, joint swelling and myalgias  Skin: Negative for rash and wound  Neurological: Negative for dizziness, weakness, light-headedness, numbness and headaches  Psychiatric/Behavioral: Negative for behavioral problems, confusion, hallucinations, sleep disturbance and suicidal ideas  The patient is nervous/anxious  Problem List:     Patient Active Problem List   Diagnosis    Acquired hypothyroidism    Essential hypertension    GERD (gastroesophageal reflux disease)    Mixed hyperlipidemia    Thyroid nodule      Objective:     /70 (BP Location: Left arm, Patient Position: Sitting, Cuff Size: Standard)   Pulse 77   Ht 5' 9 25" (1 759 m)   Wt 98 6 kg (217 lb 6 4 oz)   SpO2 92%   BMI 31 87 kg/m²     Physical Exam   Constitutional: He is oriented to person, place, and time  He appears well-developed and well-nourished  No distress  Eyes: Conjunctivae are normal  Right eye exhibits no discharge  Left eye exhibits no discharge  No scleral icterus     Neck: Neck supple  No JVD present  No thyromegaly present  Cardiovascular: Normal rate, regular rhythm and normal heart sounds  No murmur heard  Pulmonary/Chest: Effort normal and breath sounds normal  No respiratory distress  He has no wheezes  He has no rales  He exhibits no tenderness  Abdominal: Soft  Bowel sounds are normal  He exhibits no distension and no mass  There is no tenderness  There is no rebound and no guarding  No hernia  Musculoskeletal: He exhibits tenderness (Increased tenderness over bilateral anterior shoulders) and deformity (crepitus of left knee)  He exhibits no edema  Lymphadenopathy:     He has no cervical adenopathy  Neurological: He is alert and oriented to person, place, and time  Skin: Skin is warm and dry  He is not diaphoretic  Psychiatric: He has a normal mood and affect  His behavior is normal    Vitals reviewed      Elpidio Liz DO  [facility]

## 2019-01-03 ENCOUNTER — TELEPHONE (OUTPATIENT)
Dept: INTERNAL MEDICINE CLINIC | Facility: CLINIC | Age: 59
End: 2019-01-03

## 2019-01-03 NOTE — TELEPHONE ENCOUNTER
Spoke with patient and pharmacy  They are giving him what they have left which was 40 pills until they get a new shipment in  Patient was notified and will be picking up his prescription

## 2019-01-03 NOTE — TELEPHONE ENCOUNTER
[facility] CALLED AND SAID THE IRBESARTAN 150MG IS ON BACKORDER  CAN YOU ORDER AN ALTERNATIVE    [facility]    [phone]

## 2019-01-21 ENCOUNTER — TELEPHONE (OUTPATIENT)
Dept: INTERNAL MEDICINE CLINIC | Facility: CLINIC | Age: 59
End: 2019-01-21

## 2019-01-21 LAB
B BURGDOR IGG+IGM SER-ACNC: <0.91 ISR (ref 0–0.9)
B BURGDOR IGM SER IA-ACNC: <0.8 INDEX (ref 0–0.79)
TSH SERPL DL<=0.005 MIU/L-ACNC: 3.68 UIU/ML (ref 0.45–4.5)

## 2019-01-21 NOTE — TELEPHONE ENCOUNTER
----- Message from Yoana Griffin DO sent at 1/21/2019  4:55 PM EST -----  Call patient and let him know that I reviewed their recent laboratory testing and labs were normal

## 2019-02-19 ENCOUNTER — TELEPHONE (OUTPATIENT)
Dept: INTERNAL MEDICINE CLINIC | Facility: CLINIC | Age: 59
End: 2019-02-19

## 2019-02-19 DIAGNOSIS — I10 ESSENTIAL HYPERTENSION: Primary | ICD-10-CM

## 2019-02-19 RX ORDER — VALSARTAN AND HYDROCHLOROTHIAZIDE 160; 12.5 MG/1; MG/1
1 TABLET, FILM COATED ORAL DAILY
Qty: 30 TABLET | Refills: 5 | Status: SHIPPED | OUTPATIENT
Start: 2019-02-19 | End: 2019-04-01 | Stop reason: SDUPTHER

## 2019-02-19 NOTE — TELEPHONE ENCOUNTER
Patient is stating that he is having difficulty getting the Irbesartan 150 mg tablet from [facility]  Would like for Dr Jamie Khan to send the script to [facility] [address] instead  Patient states that he was taking the Valsartan Hydrochlorothiazide -25 mg tablet and it seemed to work better for him  If this product is coming back on the market, he would prefer a script for it       Please advise patient as to which medication is going to be prescribed and send medication to [facility]  [phone]    Asking for 30 day supply

## 2019-04-01 ENCOUNTER — TELEPHONE (OUTPATIENT)
Dept: INTERNAL MEDICINE CLINIC | Facility: CLINIC | Age: 59
End: 2019-04-01

## 2019-04-01 DIAGNOSIS — I10 ESSENTIAL HYPERTENSION: ICD-10-CM

## 2019-04-01 RX ORDER — VALSARTAN AND HYDROCHLOROTHIAZIDE 160; 12.5 MG/1; MG/1
1 TABLET, FILM COATED ORAL DAILY
Qty: 90 TABLET | Refills: 3 | Status: SHIPPED | OUTPATIENT
Start: 2019-04-01 | End: 2020-02-29

## 2019-04-15 ENCOUNTER — OFFICE VISIT (OUTPATIENT)
Dept: INTERNAL MEDICINE CLINIC | Facility: CLINIC | Age: 59
End: 2019-04-15
Payer: COMMERCIAL

## 2019-04-15 VITALS
DIASTOLIC BLOOD PRESSURE: 64 MMHG | BODY MASS INDEX: 30.57 KG/M2 | HEART RATE: 70 BPM | WEIGHT: 206.4 LBS | SYSTOLIC BLOOD PRESSURE: 122 MMHG | HEIGHT: 69 IN

## 2019-04-15 DIAGNOSIS — E03.9 ACQUIRED HYPOTHYROIDISM: Primary | ICD-10-CM

## 2019-04-15 DIAGNOSIS — Z11.59 NEED FOR HEPATITIS C SCREENING TEST: ICD-10-CM

## 2019-04-15 DIAGNOSIS — E78.2 MIXED HYPERLIPIDEMIA: ICD-10-CM

## 2019-04-15 DIAGNOSIS — I10 ESSENTIAL HYPERTENSION: ICD-10-CM

## 2019-04-15 DIAGNOSIS — Z12.5 SCREENING FOR PROSTATE CANCER: ICD-10-CM

## 2019-04-15 PROCEDURE — 99214 OFFICE O/P EST MOD 30 MIN: CPT | Performed by: INTERNAL MEDICINE

## 2019-04-20 LAB
ALBUMIN SERPL-MCNC: 4.5 G/DL (ref 3.5–5.5)
ALBUMIN/GLOB SERPL: 2.1 {RATIO} (ref 1.2–2.2)
ALP SERPL-CCNC: 61 IU/L (ref 39–117)
ALT SERPL-CCNC: 40 IU/L (ref 0–44)
AST SERPL-CCNC: 34 IU/L (ref 0–40)
BILIRUB SERPL-MCNC: 0.4 MG/DL (ref 0–1.2)
BUN SERPL-MCNC: 15 MG/DL (ref 6–24)
BUN/CREAT SERPL: 14 (ref 9–20)
CALCIUM SERPL-MCNC: 9.5 MG/DL (ref 8.7–10.2)
CHLORIDE SERPL-SCNC: 98 MMOL/L (ref 96–106)
CHOLEST SERPL-MCNC: 152 MG/DL (ref 100–199)
CO2 SERPL-SCNC: 28 MMOL/L (ref 20–29)
CREAT SERPL-MCNC: 1.1 MG/DL (ref 0.76–1.27)
GLOBULIN SER-MCNC: 2.1 G/DL (ref 1.5–4.5)
GLUCOSE SERPL-MCNC: 96 MG/DL (ref 65–99)
HCV AB S/CO SERPL IA: <0.1 S/CO RATIO (ref 0–0.9)
HDLC SERPL-MCNC: 51 MG/DL
LDLC SERPL CALC-MCNC: 85 MG/DL (ref 0–99)
POTASSIUM SERPL-SCNC: 4.2 MMOL/L (ref 3.5–5.2)
PROT SERPL-MCNC: 6.6 G/DL (ref 6–8.5)
PSA SERPL-MCNC: 1.6 NG/ML (ref 0–4)
SL AMB EGFR AFRICAN AMERICAN: 85 ML/MIN/1.73
SL AMB EGFR NON AFRICAN AMERICAN: 74 ML/MIN/1.73
SL AMB INTERPRETATION: NORMAL
SL AMB VLDL CHOLESTEROL CALC: 16 MG/DL (ref 5–40)
SODIUM SERPL-SCNC: 142 MMOL/L (ref 134–144)
TRIGL SERPL-MCNC: 81 MG/DL (ref 0–149)
TSH SERPL DL<=0.005 MIU/L-ACNC: 1.59 UIU/ML (ref 0.45–4.5)

## 2019-04-22 ENCOUNTER — TELEPHONE (OUTPATIENT)
Dept: INTERNAL MEDICINE CLINIC | Facility: CLINIC | Age: 59
End: 2019-04-22

## 2019-07-25 DIAGNOSIS — E03.9 ACQUIRED HYPOTHYROIDISM: ICD-10-CM

## 2019-07-25 RX ORDER — LEVOTHYROXINE SODIUM 0.07 MG/1
TABLET ORAL
Qty: 90 TABLET | Refills: 0 | Status: SHIPPED | OUTPATIENT
Start: 2019-07-25 | End: 2019-10-07 | Stop reason: SDUPTHER

## 2019-10-07 DIAGNOSIS — E78.2 MIXED HYPERLIPIDEMIA: ICD-10-CM

## 2019-10-07 DIAGNOSIS — E03.9 ACQUIRED HYPOTHYROIDISM: ICD-10-CM

## 2019-10-07 RX ORDER — SIMVASTATIN 20 MG
TABLET ORAL
Qty: 90 TABLET | Refills: 3 | Status: SHIPPED | OUTPATIENT
Start: 2019-10-07 | End: 2020-01-13

## 2019-10-07 RX ORDER — LEVOTHYROXINE SODIUM 0.07 MG/1
TABLET ORAL
Qty: 90 TABLET | Refills: 3 | Status: SHIPPED | OUTPATIENT
Start: 2019-10-07 | End: 2019-12-29 | Stop reason: SDUPTHER

## 2019-12-03 DIAGNOSIS — K21.9 GASTROESOPHAGEAL REFLUX DISEASE WITHOUT ESOPHAGITIS: ICD-10-CM

## 2019-12-03 RX ORDER — PANTOPRAZOLE SODIUM 40 MG/1
40 TABLET, DELAYED RELEASE ORAL DAILY
Qty: 90 TABLET | Refills: 3 | Status: SHIPPED | OUTPATIENT
Start: 2019-12-03 | End: 2019-12-03 | Stop reason: SDUPTHER

## 2019-12-04 RX ORDER — PANTOPRAZOLE SODIUM 40 MG/1
40 TABLET, DELAYED RELEASE ORAL DAILY
Qty: 90 TABLET | Refills: 2 | Status: SHIPPED | OUTPATIENT
Start: 2019-12-04 | End: 2020-11-28

## 2019-12-29 DIAGNOSIS — E03.9 ACQUIRED HYPOTHYROIDISM: ICD-10-CM

## 2019-12-29 RX ORDER — LEVOTHYROXINE SODIUM 0.07 MG/1
TABLET ORAL
Qty: 90 TABLET | Refills: 0 | Status: SHIPPED | OUTPATIENT
Start: 2019-12-29

## 2020-01-11 DIAGNOSIS — E78.2 MIXED HYPERLIPIDEMIA: ICD-10-CM

## 2020-01-13 RX ORDER — SIMVASTATIN 20 MG
TABLET ORAL
Qty: 90 TABLET | Refills: 0 | Status: SHIPPED | OUTPATIENT
Start: 2020-01-13

## 2020-02-29 DIAGNOSIS — I10 ESSENTIAL HYPERTENSION: ICD-10-CM

## 2020-02-29 RX ORDER — VALSARTAN AND HYDROCHLOROTHIAZIDE 160; 12.5 MG/1; MG/1
1 TABLET, FILM COATED ORAL DAILY
Qty: 90 TABLET | Refills: 0 | Status: SHIPPED | OUTPATIENT
Start: 2020-02-29

## 2020-05-22 DIAGNOSIS — I10 ESSENTIAL HYPERTENSION: ICD-10-CM

## 2020-05-22 RX ORDER — VALSARTAN AND HYDROCHLOROTHIAZIDE 160; 12.5 MG/1; MG/1
1 TABLET, FILM COATED ORAL DAILY
Qty: 90 TABLET | Refills: 0 | OUTPATIENT
Start: 2020-05-22

## 2020-09-15 DIAGNOSIS — E78.2 MIXED HYPERLIPIDEMIA: ICD-10-CM

## 2020-09-15 RX ORDER — SIMVASTATIN 20 MG
TABLET ORAL
Qty: 90 TABLET | Refills: 0 | OUTPATIENT
Start: 2020-09-15

## 2021-08-27 ENCOUNTER — OFFICE VISIT (OUTPATIENT)
Dept: OBGYN CLINIC | Facility: MEDICAL CENTER | Age: 61
End: 2021-08-27
Payer: COMMERCIAL

## 2021-08-27 VITALS
WEIGHT: 205 LBS | BODY MASS INDEX: 30.36 KG/M2 | DIASTOLIC BLOOD PRESSURE: 75 MMHG | HEIGHT: 69 IN | HEART RATE: 61 BPM | SYSTOLIC BLOOD PRESSURE: 114 MMHG

## 2021-08-27 DIAGNOSIS — M25.562 CHRONIC PAIN OF BOTH KNEES: ICD-10-CM

## 2021-08-27 DIAGNOSIS — M17.0 PRIMARY OSTEOARTHRITIS OF BOTH KNEES: Primary | ICD-10-CM

## 2021-08-27 DIAGNOSIS — M25.561 CHRONIC PAIN OF BOTH KNEES: ICD-10-CM

## 2021-08-27 DIAGNOSIS — G89.29 CHRONIC PAIN OF BOTH KNEES: ICD-10-CM

## 2021-08-27 PROCEDURE — 20610 DRAIN/INJ JOINT/BURSA W/O US: CPT | Performed by: ORTHOPAEDIC SURGERY

## 2021-08-27 PROCEDURE — 99214 OFFICE O/P EST MOD 30 MIN: CPT | Performed by: ORTHOPAEDIC SURGERY

## 2021-08-27 RX ORDER — BETAMETHASONE SODIUM PHOSPHATE AND BETAMETHASONE ACETATE 3; 3 MG/ML; MG/ML
12 INJECTION, SUSPENSION INTRA-ARTICULAR; INTRALESIONAL; INTRAMUSCULAR; SOFT TISSUE
Status: COMPLETED | OUTPATIENT
Start: 2021-08-27 | End: 2021-08-27

## 2021-08-27 RX ORDER — HYDROCHLOROTHIAZIDE 12.5 MG/1
TABLET ORAL
COMMUNITY
Start: 2021-08-26

## 2021-08-27 RX ORDER — BUPIVACAINE HYDROCHLORIDE 2.5 MG/ML
2 INJECTION, SOLUTION INFILTRATION; PERINEURAL
Status: COMPLETED | OUTPATIENT
Start: 2021-08-27 | End: 2021-08-27

## 2021-08-27 RX ORDER — LEVOTHYROXINE SODIUM 88 UG/1
TABLET ORAL
COMMUNITY
Start: 2021-08-26

## 2021-08-27 RX ORDER — VALSARTAN 160 MG/1
TABLET ORAL
COMMUNITY
Start: 2021-08-26

## 2021-08-27 RX ORDER — LIDOCAINE HYDROCHLORIDE 10 MG/ML
2 INJECTION, SOLUTION INFILTRATION; PERINEURAL
Status: COMPLETED | OUTPATIENT
Start: 2021-08-27 | End: 2021-08-27

## 2021-08-27 RX ADMIN — LIDOCAINE HYDROCHLORIDE 2 ML: 10 INJECTION, SOLUTION INFILTRATION; PERINEURAL at 09:26

## 2021-08-27 RX ADMIN — BUPIVACAINE HYDROCHLORIDE 2 ML: 2.5 INJECTION, SOLUTION INFILTRATION; PERINEURAL at 09:26

## 2021-08-27 RX ADMIN — BETAMETHASONE SODIUM PHOSPHATE AND BETAMETHASONE ACETATE 12 MG: 3; 3 INJECTION, SUSPENSION INTRA-ARTICULAR; INTRALESIONAL; INTRAMUSCULAR; SOFT TISSUE at 09:26

## 2021-08-27 NOTE — PROGRESS NOTES
Assessment  Problem List Items Addressed This Visit     None      Visit Diagnoses     Primary osteoarthritis of both knees    -  Primary    Relevant Orders    Large joint arthrocentesis    Chronic pain of both knees              Discussion and Plan:    Bilateral chronic knee pain with x-rays showing severe osteoarthritis with genu varum deformity  His bilateral knee pain is exacerbated by activity and has been progressive since starting a new job  Given he has received no treatments for his knee pain, corticosteroid injections were offered, excepted and performed to bilateral knees  Patient tolerated well  He follow-up in 8 weeks for recheck  Subjective:   Patient ID: Jona Resendiz  is a 61 y o  male      Presents for evaluation of chronic bilateral knee pain  He reports that his knee pain has been ongoing for last 5 years approximately, intermittent in nature and progressive in severity  His left knee is worse than his right  Pain is located on the medial aspect of bilateral knees  Worse with activity and exertion, relieved by rest   In the morning he wakes up with his knees feeling stiff  He has tried no specific treatments for his knees  He just started training for a new job which has exacerbated his pain due to being on his feet for 6-8 hours now  The following portions of the patient's history were reviewed and updated as appropriate: allergies, current medications, past family history, past medical history, past social history, past surgical history and problem list     Review of Systems   Constitutional: Negative for fever  HENT: Negative for congestion  Eyes: Negative for photophobia  Respiratory: Negative for shortness of breath  Cardiovascular: Negative for chest pain  Gastrointestinal: Negative for nausea and vomiting  Musculoskeletal: Positive for arthralgias  Skin: Negative for rash  Allergic/Immunologic: Negative for immunocompromised state  Neurological: Negative for headaches  Psychiatric/Behavioral: Negative for behavioral problems  Objective:  /75 (BP Location: Left arm, Patient Position: Sitting, Cuff Size: Standard)   Pulse 61   Ht 5' 9 25" (1 759 m)   Wt 93 kg (205 lb)   BMI 30 06 kg/m²       Right Knee Exam     Comments:  Skin intact   No effusion or erythema   varus alignment   Medial joint line TTP   Crepitus over patella with knee flexion and extension   Normal strength   Good range of motion  Calf compartment soft and supple  Sensation intact  Toes are warm well perfused        Left Knee Exam     Comments:  Skin intact   No effusion or erythema   varus alignment   Medial joint line TTP   Crepitus over patella with knee flexion and extension   Normal strength   Good range of motion  Calf compartment soft and supple  Sensation intact  Toes are warm well perfused            Physical Exam  Vitals reviewed  HENT:      Head: Normocephalic  Right Ear: External ear normal       Left Ear: External ear normal       Nose: Nose normal       Mouth/Throat:      Pharynx: Oropharynx is clear  Eyes:      Pupils: Pupils are equal, round, and reactive to light  Cardiovascular:      Rate and Rhythm: Normal rate  Pulses: Normal pulses  Pulmonary:      Effort: Pulmonary effort is normal    Abdominal:      Palpations: Abdomen is soft  Musculoskeletal:      Cervical back: Normal range of motion  Comments: Please see ortho exam    Skin:     Capillary Refill: Capillary refill takes less than 2 seconds  Neurological:      Mental Status: He is alert  Mental status is at baseline  Psychiatric:         Mood and Affect: Mood normal            I have personally reviewed pertinent films in PACS and my interpretation is as follows  AP and lateral x-rays of bilateral knees show varus deformity with medial joint space narrowing and bone-on-bone contact, osteophyte formation, and subchondral sclerosis      Large joint arthrocentesis: bilateral knee  Universal Protocol:  Consent: Verbal consent obtained    Risks and benefits: risks, benefits and alternatives were discussed  Consent given by: patient  Patient identity confirmed: verbally with patient    Supporting Documentation  Indications: pain   Procedure Details  Location: knee - bilateral knee  Preparation: Patient was prepped and draped in the usual sterile fashion  Needle size: 22 G  Ultrasound guidance: no  Approach: anterolateral    Medications (Right): 2 mL bupivacaine 0 25 %; 2 mL lidocaine 1 %; 12 mg betamethasone acetate-betamethasone sodium phosphate 6 (3-3) mg/mLMedications (Left): 2 mL bupivacaine 0 25 %; 2 mL lidocaine 1 %; 12 mg betamethasone acetate-betamethasone sodium phosphate 6 (3-3) mg/mL   Patient tolerance: patient tolerated the procedure well with no immediate complications  Dressing:  Sterile dressing applied

## 2022-04-26 ENCOUNTER — TELEPHONE (OUTPATIENT)
Dept: OBGYN CLINIC | Facility: HOSPITAL | Age: 62
End: 2022-04-26

## 2022-04-26 NOTE — TELEPHONE ENCOUNTER
DR Yessi Mccall  RE gel injections    [phone]    Patient called to start the process for gel injections with Dr Yessi Mccall  Patient would like to know if there is any "out of pocket " expenses prior setting up appointment  Patient did see Dr Yessi Mccall with his past insurance for steroid injections       His new insurance is :    [employer] [facility] [employer]  Member # [id number]  [address] # [id number]

## 2022-05-09 ENCOUNTER — TELEPHONE (OUTPATIENT)
Dept: OBGYN CLINIC | Facility: HOSPITAL | Age: 62
End: 2022-05-09

## 2022-05-09 NOTE — TELEPHONE ENCOUNTER
Patient called to check on status of gel injection and estimate on cost  Patient can be reached at [phone]

## 2022-05-17 NOTE — TELEPHONE ENCOUNTER
Patient called back requesting status for gel injections and cost of gel injections  He is asking for call back    He is willing to do one knee to save on cost   He is wanting to get total out of pocket cost

## 2022-06-02 ENCOUNTER — TELEPHONE (OUTPATIENT)
Dept: OBGYN CLINIC | Facility: HOSPITAL | Age: 62
End: 2022-06-02

## 2022-06-02 NOTE — TELEPHONE ENCOUNTER
Patient called to schedule his left knee injection  Patient stated he spoke with insurance and his injection was delivered today  I do not see notification in his referral to schedule  I informed patient you would reach out to him and then he could be scheduled       Thank you

## 2022-06-10 ENCOUNTER — PROCEDURE VISIT (OUTPATIENT)
Dept: OBGYN CLINIC | Facility: MEDICAL CENTER | Age: 62
End: 2022-06-10
Payer: COMMERCIAL

## 2022-06-10 VITALS
BODY MASS INDEX: 26.66 KG/M2 | WEIGHT: 180 LBS | SYSTOLIC BLOOD PRESSURE: 120 MMHG | HEART RATE: 71 BPM | HEIGHT: 69 IN | DIASTOLIC BLOOD PRESSURE: 78 MMHG

## 2022-06-10 DIAGNOSIS — M54.16 RADICULOPATHY, LUMBAR REGION: ICD-10-CM

## 2022-06-10 DIAGNOSIS — M17.12 PRIMARY OSTEOARTHRITIS OF LEFT KNEE: Primary | ICD-10-CM

## 2022-06-10 DIAGNOSIS — M25.562 CHRONIC PAIN OF LEFT KNEE: ICD-10-CM

## 2022-06-10 DIAGNOSIS — G89.29 CHRONIC PAIN OF LEFT KNEE: ICD-10-CM

## 2022-06-10 PROCEDURE — 99214 OFFICE O/P EST MOD 30 MIN: CPT | Performed by: ORTHOPAEDIC SURGERY

## 2022-06-10 PROCEDURE — 20610 DRAIN/INJ JOINT/BURSA W/O US: CPT | Performed by: ORTHOPAEDIC SURGERY

## 2022-06-10 RX ORDER — METHYLPREDNISOLONE 4 MG/1
TABLET ORAL
Qty: 1 EACH | Refills: 0 | Status: SHIPPED | OUTPATIENT
Start: 2022-06-10

## 2022-06-10 NOTE — PROGRESS NOTES
Assessment:   Diagnosis ICD-10-CM Associated Orders   1  Primary osteoarthritis of left knee  M17 12 Large joint arthrocentesis: L knee   2  Chronic pain of left knee  M25 562 Large joint arthrocentesis: L knee    G89 29    3  Radiculopathy, lumbar region  M54 16 methylPREDNISolone 4 MG tablet therapy pack     Ambulatory Referral to Pain Management       Plan:  77-year-old male with left knee osteoarthritis  Risks and benefits of Durolane injection discussed in the office today  Patient elected to proceed  Patient was provided with a Durolane injection to the left knee in the office today  Patient tolerated this procedure well with no immediate complications  Post-injection instructions discussed with the patient  They should rest, apply ice, or take Tylenol/NSAIDs as needed for post-injection soreness  In regards to the numbness in his right lower extremity, advised this is likely due to lumbar radiculopathy versus right knee osteoarthritis  I have sent a medrol dose pack to his preferred pharmacy and placed a referral for pain management if this does not help  I will plan to see him back in 3 months for repeat clinical evaluation  To do next visit:  Return in about 3 months (around 9/10/2022)  The above stated was discussed in layman's terms and the patient expressed understanding  All questions were answered to the patient's satisfaction  Scribe Attestation    I,:  Grace Khalil am acting as a scribe while in the presence of the attending physician :       I,:  Roddy Alba MD personally performed the services described in this documentation    as scribed in my presence :             Subjective:   Anel Sender  is a 64 y o  male who presents for follow-up of left knee osteoarthritis  At his last visit he had bilateral knee steroid injections which did provide him with some relief however bilateral knee viscosupplementation was ordered   He continues to complain of generalized bilateral knee pain worsened with ambulation/activity  He also complains of some numbness in the lateral aspect of the RLE but denies any lower back pain, also with increased right knee pain  He is here today for Durolane left knee, he would like to hold off on the right knee due to cost of medication  Review of systems negative unless otherwise specified in HPI  Review of Systems   Constitutional: Negative for appetite change and unexpected weight change  HENT: Negative for congestion and trouble swallowing  Eyes: Negative for visual disturbance  Respiratory: Negative for cough and shortness of breath  Cardiovascular: Negative for chest pain and palpitations  Gastrointestinal: Negative for nausea and vomiting  Endocrine: Negative for cold intolerance and heat intolerance  Musculoskeletal: Negative for gait problem and myalgias  Skin: Negative for rash  Neurological: Negative for numbness  Past Medical History:   Diagnosis Date    Essential hypertension     GERD (gastroesophageal reflux disease)     Hyperlipidemia     Hypothyroidism     Thyroid nodule        No past surgical history on file      Family History   Problem Relation Age of Onset    Hypertension Mother     Arthritis Father     Hyperlipidemia Father     Hypertension Father     No Known Problems Sister        Social History     Occupational History     Comment: Employed   Tobacco Use    Smoking status: Never Smoker    Smokeless tobacco: Never Used   Substance and Sexual Activity    Alcohol use: Yes     Comment: Social    Drug use: No    Sexual activity: Yes     Partners: Female         Current Outpatient Medications:     ascorbic acid (VITAMIN C) 1000 MG tablet, Take 1,000 mg by mouth daily, Disp: , Rfl:     levothyroxine 88 mcg tablet, , Disp: , Rfl:     methylPREDNISolone 4 MG tablet therapy pack, Use as directed on package, Disp: 1 each, Rfl: 0    Multiple Vitamin (MULTIVITAMIN) tablet, Take 1 tablet by mouth daily, Disp: , Rfl:     simvastatin (ZOCOR) 20 mg tablet, TAKE 1 TABLET BY MOUTH DAILY, Disp: 90 tablet, Rfl: 0    valsartan-hydrochlorothiazide (DIOVAN-HCT) 160-12 5 MG per tablet, TAKE 1 TABLET BY MOUTH DAILY, Disp: 90 tablet, Rfl: 0    aspirin 81 MG tablet, Take 2 tablets by mouth daily (Patient not taking: Reported on 6/10/2022), Disp: , Rfl:     COLLAGEN PO, Take by mouth (Patient not taking: Reported on 8/27/2021), Disp: , Rfl:     hydrochlorothiazide (HYDRODIURIL) 12 5 mg tablet, , Disp: , Rfl:     levothyroxine 75 mcg tablet, TAKE 1 TABLET BY MOUTH DAILY, Disp: 90 tablet, Rfl: 0    pantoprazole (PROTONIX) 40 mg tablet, Take 1 tablet (40 mg total) by mouth daily, Disp: 90 tablet, Rfl: 2    valsartan (DIOVAN) 160 mg tablet, , Disp: , Rfl:     Wheat Dextrin (BENEFIBER PO), Take by mouth, Disp: , Rfl:     Allergies   Allergen Reactions    Lisinopril Cough    Penicillins Rash     Per pt was told this when young             Vitals:    06/10/22 0944   BP: 120/78   Pulse: 71       Objective:                    Left Knee Exam     Tenderness   The patient is experiencing tenderness in the medial joint line  Tests   Varus: negative Valgus: negative    Other   Erythema: absent  Sensation: normal  Pulse: present  Swelling: none  Effusion: no effusion present    Comments:  Calf nontender and compressible      Back Exam     Tenderness   The patient is experiencing no tenderness  Other   Gait: normal   Erythema: no back redness    Comments:  Slight weakness in the RLE when compared to contralateral side          Diagnostics, reviewed and taken today if performed as documented:    None performed      Procedures, if performed today:    Large joint arthrocentesis: L knee  Universal Protocol:  Consent: Verbal consent obtained    Risks and benefits: risks, benefits and alternatives were discussed  Consent given by: patient  Time out: Immediately prior to procedure a "time out" was called to verify the correct patient, procedure, equipment, support staff and site/side marked as required  Patient understanding: patient states understanding of the procedure being performed  Site marked: the operative site was marked  Patient identity confirmed: verbally with patient    Supporting Documentation  Indications: pain   Procedure Details  Location: knee - L knee  Preparation: Patient was prepped and draped in the usual sterile fashion  Needle size: 22 G  Ultrasound guidance: no  Approach: anterolateral  Medications administered: 3 mL sodium hyaluronate 60 MG/3ML  Specialty Pharmacy Supplied: received medications from pharmacy  Patient tolerance: patient tolerated the procedure well with no immediate complications  Dressing:  Sterile dressing applied        Portions of the record may have been created with voice recognition software  Occasional wrong word or "sound a like" substitutions may have occurred due to the inherent limitations of voice recognition software  Read the chart carefully and recognize, using context, where substitutions have occurred

## 2022-09-15 ENCOUNTER — TELEPHONE (OUTPATIENT)
Dept: GASTROENTEROLOGY | Facility: CLINIC | Age: 62
End: 2022-09-15

## 2022-09-15 ENCOUNTER — HOSPITAL ENCOUNTER (EMERGENCY)
Facility: HOSPITAL | Age: 62
Discharge: HOME | End: 2022-09-15
Attending: EMERGENCY MEDICINE
Payer: COMMERCIAL

## 2022-09-15 ENCOUNTER — APPOINTMENT (EMERGENCY)
Dept: CT IMAGING | Facility: HOSPITAL | Age: 62
End: 2022-09-15
Payer: COMMERCIAL

## 2022-09-15 VITALS
RESPIRATION RATE: 19 BRPM | TEMPERATURE: 97.5 F | DIASTOLIC BLOOD PRESSURE: 76 MMHG | SYSTOLIC BLOOD PRESSURE: 158 MMHG | HEART RATE: 70 BPM | OXYGEN SATURATION: 97 %

## 2022-09-15 DIAGNOSIS — R10.32 LEFT LOWER QUADRANT ABDOMINAL PAIN: Primary | ICD-10-CM

## 2022-09-15 LAB
ALBUMIN SERPL BCP-MCNC: 4 G/DL (ref 3.5–5)
ALP SERPL-CCNC: 67 U/L (ref 46–116)
ALT SERPL W P-5'-P-CCNC: 48 U/L (ref 12–78)
ANION GAP SERPL CALCULATED.3IONS-SCNC: 4 MMOL/L (ref 4–13)
AST SERPL W P-5'-P-CCNC: 62 U/L (ref 5–45)
BASOPHILS # BLD AUTO: 0.03 THOUSANDS/ΜL (ref 0–0.1)
BASOPHILS NFR BLD AUTO: 1 % (ref 0–1)
BILIRUB SERPL-MCNC: 1.18 MG/DL (ref 0.2–1)
BILIRUB UR QL STRIP: NEGATIVE
BUN SERPL-MCNC: 13 MG/DL (ref 5–25)
CALCIUM SERPL-MCNC: 9.1 MG/DL (ref 8.3–10.1)
CHLORIDE SERPL-SCNC: 97 MMOL/L (ref 96–108)
CLARITY UR: CLEAR
CO2 SERPL-SCNC: 31 MMOL/L (ref 21–32)
COLOR UR: YELLOW
CREAT SERPL-MCNC: 0.77 MG/DL (ref 0.6–1.3)
EOSINOPHIL # BLD AUTO: 0.1 THOUSAND/ΜL (ref 0–0.61)
EOSINOPHIL NFR BLD AUTO: 2 % (ref 0–6)
ERYTHROCYTE [DISTWIDTH] IN BLOOD BY AUTOMATED COUNT: 13.1 % (ref 11.6–15.1)
GFR SERPL CREATININE-BSD FRML MDRD: 97 ML/MIN/1.73SQ M
GLUCOSE SERPL-MCNC: 94 MG/DL (ref 65–140)
GLUCOSE UR STRIP-MCNC: NEGATIVE MG/DL
HCT VFR BLD AUTO: 47.4 % (ref 36.5–49.3)
HGB BLD-MCNC: 16.6 G/DL (ref 12–17)
HGB UR QL STRIP.AUTO: NEGATIVE
IMM GRANULOCYTES # BLD AUTO: 0 THOUSAND/UL (ref 0–0.2)
IMM GRANULOCYTES NFR BLD AUTO: 0 % (ref 0–2)
KETONES UR STRIP-MCNC: NEGATIVE MG/DL
LEUKOCYTE ESTERASE UR QL STRIP: NEGATIVE
LIPASE SERPL-CCNC: 59 U/L (ref 73–393)
LYMPHOCYTES # BLD AUTO: 0.95 THOUSANDS/ΜL (ref 0.6–4.47)
LYMPHOCYTES NFR BLD AUTO: 20 % (ref 14–44)
MCH RBC QN AUTO: 30.5 PG (ref 26.8–34.3)
MCHC RBC AUTO-ENTMCNC: 35 G/DL (ref 31.4–37.4)
MCV RBC AUTO: 87 FL (ref 82–98)
MONOCYTES # BLD AUTO: 0.51 THOUSAND/ΜL (ref 0.17–1.22)
MONOCYTES NFR BLD AUTO: 11 % (ref 4–12)
NEUTROPHILS # BLD AUTO: 3.16 THOUSANDS/ΜL (ref 1.85–7.62)
NEUTS SEG NFR BLD AUTO: 66 % (ref 43–75)
NITRITE UR QL STRIP: NEGATIVE
NRBC BLD AUTO-RTO: 0 /100 WBCS
PH UR STRIP.AUTO: 7 [PH]
PLATELET # BLD AUTO: 188 THOUSANDS/UL (ref 149–390)
PMV BLD AUTO: 9.8 FL (ref 8.9–12.7)
POTASSIUM SERPL-SCNC: 4.5 MMOL/L (ref 3.5–5.3)
PROT SERPL-MCNC: 7.2 G/DL (ref 6.4–8.4)
PROT UR STRIP-MCNC: NEGATIVE MG/DL
RBC # BLD AUTO: 5.44 MILLION/UL (ref 3.88–5.62)
SODIUM SERPL-SCNC: 132 MMOL/L (ref 135–147)
SP GR UR STRIP.AUTO: 1.01 (ref 1–1.03)
UROBILINOGEN UR QL STRIP.AUTO: 0.2 E.U./DL
WBC # BLD AUTO: 4.75 THOUSAND/UL (ref 4.31–10.16)

## 2022-09-15 PROCEDURE — 83690 ASSAY OF LIPASE: CPT | Performed by: PHYSICIAN ASSISTANT

## 2022-09-15 PROCEDURE — 36415 COLL VENOUS BLD VENIPUNCTURE: CPT | Performed by: PHYSICIAN ASSISTANT

## 2022-09-15 PROCEDURE — 80053 COMPREHEN METABOLIC PANEL: CPT | Performed by: PHYSICIAN ASSISTANT

## 2022-09-15 PROCEDURE — 81003 URINALYSIS AUTO W/O SCOPE: CPT | Performed by: PHYSICIAN ASSISTANT

## 2022-09-15 PROCEDURE — 96361 HYDRATE IV INFUSION ADD-ON: CPT

## 2022-09-15 PROCEDURE — 74177 CT ABD & PELVIS W/CONTRAST: CPT

## 2022-09-15 PROCEDURE — G1004 CDSM NDSC: HCPCS

## 2022-09-15 PROCEDURE — 99284 EMERGENCY DEPT VISIT MOD MDM: CPT | Performed by: PHYSICIAN ASSISTANT

## 2022-09-15 PROCEDURE — 99284 EMERGENCY DEPT VISIT MOD MDM: CPT

## 2022-09-15 PROCEDURE — 85025 COMPLETE CBC W/AUTO DIFF WBC: CPT | Performed by: PHYSICIAN ASSISTANT

## 2022-09-15 PROCEDURE — 96374 THER/PROPH/DIAG INJ IV PUSH: CPT

## 2022-09-15 RX ORDER — KETOROLAC TROMETHAMINE 30 MG/ML
15 INJECTION, SOLUTION INTRAMUSCULAR; INTRAVENOUS ONCE
Status: COMPLETED | OUTPATIENT
Start: 2022-09-15 | End: 2022-09-15

## 2022-09-15 RX ADMIN — IOHEXOL 95 ML: 350 INJECTION, SOLUTION INTRAVENOUS at 21:49

## 2022-09-15 RX ADMIN — KETOROLAC TROMETHAMINE 15 MG: 30 INJECTION, SOLUTION INTRAMUSCULAR at 20:59

## 2022-09-15 RX ADMIN — SODIUM CHLORIDE 1000 ML: 0.9 INJECTION, SOLUTION INTRAVENOUS at 21:01

## 2022-09-15 NOTE — TELEPHONE ENCOUNTER
FYI: Spoke with patient  He is not an established patient  Advised patient to go to ED for evaluation  He has been on a cipro flagyl course for  Left sided abdominal pain which has not resolved for 5 days  Patient will go to ED for evaluation

## 2022-09-15 NOTE — TELEPHONE ENCOUNTER
Patients GI provider:  Gerri Oropeza Prost    Number to return call: ([phone])    Reason for call: Pt calling because he has had pain on left side, PCP put on medication for possible diverticulitis flare but it isn't working  I have no sooner appointments to offer  Please call patient to see if you can get him in sooner  Thank you!      Scheduled procedure/appointment date if applicable: Apt/procedure 10/3 with Latoya, however previous patient of Dr Jael Watkins

## 2022-09-16 NOTE — ED PROVIDER NOTES
History  Chief Complaint   Patient presents with    Abdominal Pain     LLQ pain, started on abx Sunday night for diverticulitis and still having pain       Patient is a 59-year-old male with a past medical history significant for hypertension, hyperlipidemia, hypothyroidism presenting to the emergency department for evaluation of left lower quadrant abdominal that has been intermittent over the last 2-3 years, however seems to have acutely worsened over the last 2 weeks  He was started on levofloxacin and Flagyl Sunday for presumed diverticulitis, he did not have any imaging at that time  Pain is mild in severity  Does not radiate  No exacerbating or alleviating factors  No fevers, chills, chest pain, difficulty breathing, nausea, vomiting, diarrhea  He does endorse constipation  No other complaints at this time  History provided by:  Patient   used: No    Abdominal Pain  Pain location:  LLQ  Pain quality: sharp and stabbing    Pain radiates to:  Does not radiate  Pain severity:  Mild  Onset quality:  Gradual  Timing:  Intermittent  Progression:  Waxing and waning  Chronicity:  Recurrent  Relieved by:  Nothing  Worsened by:  Nothing  Ineffective treatments: Antibiotics  Associated symptoms: constipation    Associated symptoms: no anorexia, no belching, no chest pain, no chills, no cough, no diarrhea, no dysuria, no fatigue, no fever, no flatus, no hematemesis, no hematochezia, no hematuria, no melena, no nausea, no shortness of breath, no sore throat and no vomiting        Prior to Admission Medications   Prescriptions Last Dose Informant Patient Reported? Taking?    COLLAGEN PO  Self Yes No   Sig: Take by mouth   Patient not taking: Reported on 8/27/2021   Multiple Vitamin (MULTIVITAMIN) tablet  Self Yes No   Sig: Take 1 tablet by mouth daily   Wheat Dextrin (BENEFIBER PO)  Self Yes No   Sig: Take by mouth   ascorbic acid (VITAMIN C) 1000 MG tablet  Self Yes No   Sig: Take 1,000 mg by mouth daily   aspirin 81 MG tablet  Self Yes No   Sig: Take 2 tablets by mouth daily   Patient not taking: Reported on 6/10/2022   hydrochlorothiazide (HYDRODIURIL) 12 5 mg tablet  Self Yes No   levothyroxine 75 mcg tablet  Self No No   Sig: TAKE 1 TABLET BY MOUTH DAILY   levothyroxine 88 mcg tablet  Self Yes No   methylPREDNISolone 4 MG tablet therapy pack   No No   Sig: Use as directed on package   pantoprazole (PROTONIX) 40 mg tablet   No No   Sig: Take 1 tablet (40 mg total) by mouth daily   simvastatin (ZOCOR) 20 mg tablet  Self No No   Sig: TAKE 1 TABLET BY MOUTH DAILY   valsartan (DIOVAN) 160 mg tablet  Self Yes No   valsartan-hydrochlorothiazide (DIOVAN-HCT) 160-12 5 MG per tablet  Self No No   Sig: TAKE 1 TABLET BY MOUTH DAILY      Facility-Administered Medications: None       Past Medical History:   Diagnosis Date    Essential hypertension     GERD (gastroesophageal reflux disease)     Hyperlipidemia     Hypothyroidism     Thyroid nodule        No past surgical history on file  Family History   Problem Relation Age of Onset    Hypertension Mother     Arthritis Father     Hyperlipidemia Father     Hypertension Father     No Known Problems Sister      I have reviewed and agree with the history as documented  E-Cigarette/Vaping     E-Cigarette/Vaping Substances     Social History     Tobacco Use    Smoking status: Never Smoker    Smokeless tobacco: Never Used   Substance Use Topics    Alcohol use: Yes     Comment: Social    Drug use: No       Review of Systems   Constitutional: Negative for chills, fatigue and fever  HENT: Negative for sore throat  Respiratory: Negative for cough and shortness of breath  Cardiovascular: Negative for chest pain  Gastrointestinal: Positive for abdominal pain and constipation  Negative for anorexia, diarrhea, flatus, hematemesis, hematochezia, melena, nausea and vomiting  Genitourinary: Negative for dysuria and hematuria     All other systems reviewed and are negative  Physical Exam  Physical Exam  Vitals reviewed  Constitutional:       General: He is not in acute distress  Appearance: Normal appearance  He is normal weight  He is not ill-appearing, toxic-appearing or diaphoretic  HENT:      Head: Normocephalic and atraumatic  Right Ear: External ear normal       Left Ear: External ear normal       Mouth/Throat:      Mouth: Mucous membranes are moist       Pharynx: Oropharynx is clear  No oropharyngeal exudate or posterior oropharyngeal erythema  Eyes:      General: No scleral icterus  Right eye: No discharge  Left eye: No discharge  Extraocular Movements: Extraocular movements intact  Conjunctiva/sclera: Conjunctivae normal    Cardiovascular:      Rate and Rhythm: Normal rate and regular rhythm  Pulses: Normal pulses  Heart sounds: Normal heart sounds  No murmur heard  No friction rub  No gallop  Pulmonary:      Effort: Pulmonary effort is normal  No respiratory distress  Breath sounds: Normal breath sounds  No stridor  No wheezing, rhonchi or rales  Abdominal:      General: Abdomen is flat  Palpations: Abdomen is soft  Tenderness: There is no abdominal tenderness (No abdominal tenderness despite subjective complaint of pain  )  There is no guarding or rebound  Musculoskeletal:         General: Normal range of motion  Cervical back: Normal range of motion and neck supple  Right lower leg: No edema  Left lower leg: No edema  Skin:     General: Skin is warm and dry  Capillary Refill: Capillary refill takes less than 2 seconds  Neurological:      General: No focal deficit present  Mental Status: He is alert and oriented to person, place, and time     Psychiatric:         Mood and Affect: Mood normal          Behavior: Behavior normal          Vital Signs  ED Triage Vitals [09/15/22 1625]   Temperature Pulse Respirations Blood Pressure SpO2   97 6 °F (36 4 °C) 69 18 134/67 100 %      Temp Source Heart Rate Source Patient Position - Orthostatic VS BP Location FiO2 (%)   Temporal Monitor Sitting Left arm --      Pain Score       5           Vitals:    09/15/22 1625 09/15/22 2027   BP: 134/67 158/76   Pulse: 69 70   Patient Position - Orthostatic VS: Sitting Sitting         Visual Acuity      ED Medications  Medications   sodium chloride 0 9 % bolus 1,000 mL (1,000 mL Intravenous New Bag 9/15/22 2101)   ketorolac (TORADOL) injection 15 mg (15 mg Intravenous Given 9/15/22 2059)   iohexol (OMNIPAQUE) 350 MG/ML injection (SINGLE-DOSE) 100 mL (95 mL Intravenous Given 9/15/22 2149)       Diagnostic Studies  Results Reviewed     Procedure Component Value Units Date/Time    UA w Reflex to Microscopic w Reflex to Culture [[id number]] Collected: 09/15/22 2214    Lab Status: Final result Specimen: Urine, Clean Catch Updated: 09/15/22 2232     Color, UA Yellow     Clarity, UA Clear     Specific Gravity, UA 1 010     pH, UA 7 0     Leukocytes, UA Negative     Nitrite, UA Negative     Protein, UA Negative mg/dl      Glucose, UA Negative mg/dl      Ketones, UA Negative mg/dl      Urobilinogen, UA 0 2 E U /dl      Bilirubin, UA Negative     Occult Blood, UA Negative    Comprehensive metabolic panel [[id number]]  (Abnormal) Collected: 09/15/22 2100    Lab Status: Final result Specimen: Blood from Arm, Left Updated: 09/15/22 2124     Sodium 132 mmol/L      Potassium 4 5 mmol/L      Chloride 97 mmol/L      CO2 31 mmol/L      ANION GAP 4 mmol/L      BUN 13 mg/dL      Creatinine 0 77 mg/dL      Glucose 94 mg/dL      Calcium 9 1 mg/dL      AST 62 U/L      ALT 48 U/L      Alkaline Phosphatase 67 U/L      Total Protein 7 2 g/dL      Albumin 4 0 g/dL      Total Bilirubin 1 18 mg/dL      eGFR 97 ml/min/1 73sq m     Narrative:      [place] guidelines for Chronic Kidney Disease (CKD):     Stage 1 with normal or high GFR (GFR > 90 mL/min/1 73 square meters)    Stage 2 Mild CKD (GFR = 60-89 mL/min/1 73 square meters)    Stage 3A Moderate CKD (GFR = 45-59 mL/min/1 73 square meters)    Stage 3B Moderate CKD (GFR = 30-44 mL/min/1 73 square meters)    Stage 4 Severe CKD (GFR = 15-29 mL/min/1 73 square meters)    Stage 5 End Stage CKD (GFR <15 mL/min/1 73 square meters)  Note: GFR calculation is accurate only with a steady state creatinine    Lipase [[id number]]  (Abnormal) Collected: 09/15/22 2100    Lab Status: Final result Specimen: Blood from Arm, Left Updated: 09/15/22 2124     Lipase 59 u/L     CBC and differential [[id number]] Collected: 09/15/22 2100    Lab Status: Final result Specimen: Blood from Arm, Left Updated: 09/15/22 2107     WBC 4 75 Thousand/uL      RBC 5 44 Million/uL      Hemoglobin 16 6 g/dL      Hematocrit 47 4 %      MCV 87 fL      MCH 30 5 pg      MCHC 35 0 g/dL      RDW 13 1 %      MPV 9 8 fL      Platelets 885 Thousands/uL      nRBC 0 /100 WBCs      Neutrophils Relative 66 %      Immat GRANS % 0 %      Lymphocytes Relative 20 %      Monocytes Relative 11 %      Eosinophils Relative 2 %      Basophils Relative 1 %      Neutrophils Absolute 3 16 Thousands/µL      Immature Grans Absolute 0 00 Thousand/uL      Lymphocytes Absolute 0 95 Thousands/µL      Monocytes Absolute 0 51 Thousand/µL      Eosinophils Absolute 0 10 Thousand/µL      Basophils Absolute 0 03 Thousands/µL                  CT abdomen pelvis with contrast   Final Result by Andrews Asher MD (09/15 2310)      No acute inflammatory process identified within the abdomen or pelvis  Workstation performed: [id number]                    Procedures  Procedures         ED Course                               SBIRT 20yo+    Flowsheet Row Most Recent Value   SBIRT (25 yo +)    In order to provide better care to our patients, we are screening all of our patients for alcohol and drug use  Would it be okay to ask you these screening questions?  No Filed at: 09/15/2022 7500 MDM  Number of Diagnoses or Management Options  Left lower quadrant abdominal pain  Diagnosis management comments: Patient presenting for evaluation of left lower quadrant abdominal pain  Intermittent over the last several years, worsened recently  Taking antibiotics for presumed diverticulitis  No tenderness despite subjective complaint of pain  Vital signs unremarkable  Lab work reassuring  CT of the abdomen pelvis did not reveal any acute pathology  Unclear etiology of patient's symptoms at this time  He was discharged home with instructions to follow-up with gastroenterology  Strict return precautions were discussed  He is in stable condition at time of discharge       Amount and/or Complexity of Data Reviewed  Clinical lab tests: ordered and reviewed  Tests in the radiology section of CPT®: ordered and reviewed    Patient Progress  Patient progress: stable      Disposition  Final diagnoses:   Left lower quadrant abdominal pain     Time reflects when diagnosis was documented in both MDM as applicable and the Disposition within this note     Time User Action Codes Description Comment    9/15/2022 11:39 PM Corey Higgins Add [R10 32] Left lower quadrant abdominal pain       ED Disposition     ED Disposition   Discharge    Condition   Stable    Date/Time   Thu Sep 15, 2022 11:39 PM    Comment   Luis Jansen Jr  discharge to home/self care                 Follow-up Information     Follow up With Specialties Details Why Contact Info Additional [address] [facility] Emergency Department Emergency Medicine Go to  If symptoms worsen [address] [facility] [address] [facility] Emergency Department, [address], [place], [address]  [facility] Gastroenterology Schedule an appointment as soon as possible for a visit  For follow up [address] [place] [address],  [phone] [address] [facility],[address] [address]  Kendra Rosenberg [facility] [facility], [address], [address], [facility], [place], [address] [phone]           Patient's Medications   Discharge Prescriptions    No medications on file       No discharge procedures on file      PDMP Review     None          ED Provider  Electronically Signed by           Jayce Goodwin PA-C  09/15/22 2867

## 2022-10-14 ENCOUNTER — TELEPHONE (OUTPATIENT)
Dept: OBGYN CLINIC | Facility: HOSPITAL | Age: 62
End: 2022-10-14

## 2022-10-14 NOTE — TELEPHONE ENCOUNTER
Caller: Joe Ferreira  Doctor: Dr Reginald Pineda      Reason for call: Patient would like Benjamin Scheuermann in left knee, Tracey did not work much on RT knee, Would like something else, also needs to know what out of pocket would be  And would like before end of year       Call back#: [phone]

## 2022-10-18 DIAGNOSIS — M17.12 ARTHRITIS OF LEFT KNEE: Primary | ICD-10-CM

## 2022-10-18 NOTE — TELEPHONE ENCOUNTER
Liz Darling! I would need a referral to begin working on this  Once I have the referral I will start working on the PA and ordering the medication  Thank you

## 2022-10-31 RX ORDER — METRONIDAZOLE 500 MG/1
TABLET ORAL
COMMUNITY
Start: 2022-08-25

## 2022-10-31 RX ORDER — LEVOFLOXACIN 500 MG/1
TABLET, FILM COATED ORAL
COMMUNITY
Start: 2022-08-25

## 2022-10-31 RX ORDER — ALPRAZOLAM 0.25 MG/1
0.25 TABLET ORAL DAILY PRN
COMMUNITY
Start: 2022-07-28

## 2022-11-02 ENCOUNTER — CONSULT (OUTPATIENT)
Dept: GASTROENTEROLOGY | Facility: CLINIC | Age: 62
End: 2022-11-02

## 2022-11-02 VITALS
SYSTOLIC BLOOD PRESSURE: 110 MMHG | DIASTOLIC BLOOD PRESSURE: 70 MMHG | HEIGHT: 70 IN | HEART RATE: 74 BPM | BODY MASS INDEX: 25.48 KG/M2 | OXYGEN SATURATION: 98 % | WEIGHT: 178 LBS

## 2022-11-02 DIAGNOSIS — R14.0 BLOATING: ICD-10-CM

## 2022-11-02 DIAGNOSIS — R79.89 ELEVATED LFTS: Primary | ICD-10-CM

## 2022-11-02 DIAGNOSIS — R10.32 LEFT LOWER QUADRANT ABDOMINAL PAIN: ICD-10-CM

## 2022-11-02 NOTE — PROGRESS NOTES
[facility] - Outpatient Consultation  Vani Saldana  58 y o  male MRN: [id number]  Encounter: [id number]          ASSESSMENT AND PLAN:      1  Elevated LFTs    2  Bloating    3  Left lower quadrant abdominal pain    I have recommended probiotic, either align or Florastor  I will await the labs that have already been drawn to determine what test of already been performed  I suspect that a full autoimmune panel has been ordered  In addition, especially if the patient's liver enzymes remain persistently elevated, he will need additional laboratories for hepatitis-B, hepatitis-C, iron studies    Follow-up in 1 month    No endoscopic tests are required at this time    ______________________________________________________________________    HPI:  Grazyna Tolentino is a 80-year-old male who comes the office today with a complaint of left lower quadrant discomfort  He states that he had a more significant left lower quadrant abdominal pain that brought him to the emergency department at [facility] on September 15, 2022  This was associated with bloating  He underwent CT scan of the abdomen pelvis which I have reviewed and was interpreted as being completely normal   There was no evidence of diverticulitis  His CBC was normal at that time as well with a normal white blood cell count  His liver enzymes however were abnormal with an AST of 62 and ALT of 48 and a total bili of 1 18  Previous liver enzyme panel 2019 was normal   He has no prior history of liver disease  He denies heavy alcohol consumption  He denies any IV drug usage or previous blood transfusion  He is taking medications of for high blood pressure  He states that his primary care physician is doing of current autoimmune workup on him which included removing 9 vials of blood recently  Those labs are still pending at this time  The labs were done outside of the [address] [facility],[address] and they are not available on [facility]    He also states that he was making shakes with fruits and vegetables and whenever he would drink this it cause worsening discomfort in the left lower quadrant as well as bloating  He currently denies any diarrhea, rectal bleeding, heartburn, nausea, vomiting  He does have occasional episodes of constipation  Since he left the emergency room he has had no further abdominal pains  He does feel a discomfort or pressure  His last colonoscopy was performed about 7 years ago and there was no history of polyps at that time  REVIEW OF SYSTEMS:    CONSTITUTIONAL: Denies any fever, chills, rigors, and weight loss  HEENT: No earache or tinnitus  Denies hearing loss or visual disturbances  CARDIOVASCULAR: No chest pain or palpitations  RESPIRATORY: Denies any cough, hemoptysis, shortness of breath or dyspnea on exertion  GASTROINTESTINAL: As noted in the History of Present Illness  GENITOURINARY: No problems with urination  Denies any hematuria or dysuria  NEUROLOGIC: No dizziness or vertigo, denies headaches  MUSCULOSKELETAL: Denies any muscle or joint pain  SKIN: Denies skin rashes or itching  ENDOCRINE: Denies excessive thirst  Denies intolerance to heat or cold  PSYCHOSOCIAL: Denies depression or anxiety  Denies any recent memory loss  Historical Information   Past Medical History:   Diagnosis Date   • Essential hypertension    • GERD (gastroesophageal reflux disease)    • Hyperlipidemia    • Hypothyroidism    • Thyroid nodule      History reviewed  No pertinent surgical history    Social History   Social History     Substance and Sexual Activity   Alcohol Use Yes    Comment: Social     Social History     Substance and Sexual Activity   Drug Use No     Social History     Tobacco Use   Smoking Status Never Smoker   Smokeless Tobacco Never Used     Family History   Problem Relation Age of Onset   • Hypertension Mother    • Arthritis Father    • Hyperlipidemia Father    • Hypertension Father    • No Known Problems Sister        Meds/Allergies       Current Outpatient Medications:   •  ALPRAZolam (XANAX) 0 25 mg tablet  •  ascorbic acid (VITAMIN C) 1000 MG tablet  •  levofloxacin (LEVAQUIN) 500 mg tablet  •  levothyroxine 88 mcg tablet  •  methylPREDNISolone 4 MG tablet therapy pack  •  metroNIDAZOLE (FLAGYL) 500 mg tablet  •  Multiple Vitamin (MULTIVITAMIN) tablet  •  simvastatin (ZOCOR) 20 mg tablet  •  valsartan-hydrochlorothiazide (DIOVAN-HCT) 160-12 5 MG per tablet  •  aspirin 81 MG tablet    Allergies   Allergen Reactions   • Lisinopril Cough   • Penicillins Rash     Per pt was told this when young            Objective     Blood pressure 110/70, pulse 74, height 5' 10" (1 778 m), weight 80 7 kg (178 lb), SpO2 98 %  Body mass index is 25 54 kg/m²  PHYSICAL EXAM:      General Appearance:   Alert, cooperative, no distress   HEENT:   Normocephalic, atraumatic, anicteric      Neck:  Supple, symmetrical, trachea midline   Lungs:   Clear to auscultation bilaterally; no rales, rhonchi or wheezing; respirations unlabored    Heart[de-identified]   Regular rate and rhythm; no murmur, rub, or gallop  Abdomen:   Soft, non-tender, non-distended; normal bowel sounds; no masses, no organomegaly    Genitalia:   Deferred    Rectal:   Deferred    Extremities:  No cyanosis, clubbing or edema    Pulses:  2+ and symmetric    Skin:  No jaundice, rashes, or lesions    Lymph nodes:  No palpable cervical lymphadenopathy        Lab Results:   No visits with results within 1 Day(s) from this visit     Latest known visit with results is:   Admission on 09/15/2022, Discharged on 09/15/2022   Component Date Value   • WBC 09/15/2022 4 75    • RBC 09/15/2022 5 44    • Hemoglobin 09/15/2022 16 6    • Hematocrit 09/15/2022 47 4    • MCV 09/15/2022 87    • MCH 09/15/2022 30 5    • MCHC 09/15/2022 35 0    • RDW 09/15/2022 13 1    • MPV 09/15/2022 9 8    • Platelets 57/38/3985 188    • nRBC 09/15/2022 0    • Neutrophils Relative 09/15/2022 66    • Immat GRANS % 09/15/2022 0    • Lymphocytes Relative 09/15/2022 20    • Monocytes Relative 09/15/2022 11    • Eosinophils Relative 09/15/2022 2    • Basophils Relative 09/15/2022 1    • Neutrophils Absolute 09/15/2022 3 16    • Immature Grans Absolute 09/15/2022 0 00    • Lymphocytes Absolute 09/15/2022 0 95    • Monocytes Absolute 09/15/2022 0 51    • Eosinophils Absolute 09/15/2022 0 10    • Basophils Absolute 09/15/2022 0 03    • Sodium 09/15/2022 132 (A)   • Potassium 09/15/2022 4 5    • Chloride 09/15/2022 97    • CO2 09/15/2022 31    • ANION GAP 09/15/2022 4    • BUN 09/15/2022 13    • Creatinine 09/15/2022 0 77    • Glucose 09/15/2022 94    • Calcium 09/15/2022 9 1    • AST 09/15/2022 62 (A)   • ALT 09/15/2022 48    • Alkaline Phosphatase 09/15/2022 67    • Total Protein 09/15/2022 7 2    • Albumin 09/15/2022 4 0    • Total Bilirubin 09/15/2022 1 18 (A)   • eGFR 09/15/2022 97    • Lipase 09/15/2022 59 (A)   • Color, UA 09/15/2022 Yellow    • Clarity, UA 09/15/2022 Clear    • Specific Gravity, UA 09/15/2022 1 010    • pH, UA 09/15/2022 7 0    • Leukocytes, UA 09/15/2022 Negative    • Nitrite, UA 09/15/2022 Negative    • Protein, UA 09/15/2022 Negative    • Glucose, UA 09/15/2022 Negative    • Ketones, UA 09/15/2022 Negative    • Urobilinogen, UA 09/15/2022 0 2    • Bilirubin, UA 09/15/2022 Negative    • Occult Blood, UA 09/15/2022 Negative          Radiology Results:   No results found    Answers for HPI/ROS submitted by the patient on 10/26/2022  Chronicity: recurrent  Onset: more than 1 year ago  Onset quality: sudden  Frequency: intermittently  Episode duration: 7 days  Progression since onset: gradually worsening  Pain location: left flank  Pain - numeric: 6/10  Pain quality: cramping, a sensation of fullness, sharp  Radiates to: does not radiate  anorexia: No  arthralgias: Yes  belching: Yes  constipation: Yes  diarrhea: No  dysuria: No  fever: No  flatus: Yes  frequency: Yes  headaches: No  hematochezia: No  hematuria: No  melena: No  myalgias: Yes  nausea:  No  weight loss: Yes  vomiting: No  Aggravated by: certain positions  Relieved by: being still, certain positions  Diagnostic workup: CT scan

## 2022-11-07 ENCOUNTER — TELEPHONE (OUTPATIENT)
Dept: GASTROENTEROLOGY | Facility: CLINIC | Age: 62
End: 2022-11-07

## 2022-11-07 ENCOUNTER — HOSPITAL ENCOUNTER (EMERGENCY)
Facility: HOSPITAL | Age: 62
Discharge: HOME | End: 2022-11-07
Attending: EMERGENCY MEDICINE

## 2022-11-07 VITALS
HEART RATE: 60 BPM | SYSTOLIC BLOOD PRESSURE: 139 MMHG | WEIGHT: 184.53 LBS | TEMPERATURE: 98.4 F | HEIGHT: 70 IN | DIASTOLIC BLOOD PRESSURE: 73 MMHG | BODY MASS INDEX: 26.42 KG/M2 | OXYGEN SATURATION: 96 % | RESPIRATION RATE: 20 BRPM

## 2022-11-07 DIAGNOSIS — R53.83 FATIGUE: ICD-10-CM

## 2022-11-07 DIAGNOSIS — R21 RASH: Primary | ICD-10-CM

## 2022-11-07 NOTE — TELEPHONE ENCOUNTER
----- Message from [facility]  Alirio Kiran  sent at 11/5/2022  7:41 AM EDT -----  Regarding: BLOOD WORK/ FAMILY PRACTITIONER  Hello, attached is latest bloodwork results Dr Nikita Doty requested from my "out of network" family practitioner! Kindly confirm receipt! Regards!  Ashia Daughters

## 2022-11-08 NOTE — ED PROVIDER NOTES
History  Chief Complaint   Patient presents with   • Rash     Rash to B/L hands, face seen at PCP recently for same 'classic lupus stuff' per patient reports worsening rash to hands today      66-year-old male patient presenting here with his wife with reports of bilateral rash to his hands  We have been following with her primary care for intermittently occurring rashes involving the face and hands  He reports feeling generally fatigued over the last several weeks to months  His spouse reports a 5 lb weight loss over several months  He came today because the rash on his hands was significantly erythematous at home but by the time he arrived here has been improving  He is noted to have  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 Taking?    ALPRAZolam (XANAX) 0 25 mg tablet  Self Yes No   Sig: Take 0 25 mg by mouth daily as needed   Multiple Vitamin (MULTIVITAMIN) tablet  Self Yes No   Sig: Take 1 tablet by mouth daily   ascorbic acid (VITAMIN C) 1000 MG tablet  Self Yes No   Sig: Take 1,000 mg by mouth daily   aspirin 81 MG tablet   Yes No   Sig: Take 2 tablets by mouth daily   Patient not taking: No sig reported   levofloxacin (LEVAQUIN) 500 mg tablet  Self Yes No   Sig: TAKE ONE TABLET BY MOUTH EVERY 24 HOURS FOR 10 DAYS   levothyroxine 88 mcg tablet  Self Yes No   methylPREDNISolone 4 MG tablet therapy pack  Self No No   Sig: Use as directed on package   metroNIDAZOLE (FLAGYL) 500 mg tablet  Self Yes No   Sig: TAKE ONE TABLET BY MOUTH EVERY 8 HOURS FOR 10 DAYS   simvastatin (ZOCOR) 20 mg tablet  Self No No   Sig: TAKE 1 TABLET BY MOUTH DAILY   valsartan-hydrochlorothiazide (DIOVAN-HCT) 160-12 5 MG per tablet  Self No No Sig: TAKE 1 TABLET BY MOUTH DAILY      Facility-Administered Medications: None       Past Medical History:   Diagnosis Date   • Disease of thyroid gland    • Essential hypertension    • GERD (gastroesophageal reflux disease)    • Hyperlipidemia    • Hypertension    • Hypothyroidism    • Thyroid nodule        History reviewed  No pertinent surgical history  Family History   Problem Relation Age of Onset   • Hypertension Mother    • Arthritis Father    • Hyperlipidemia Father    • Hypertension Father    • No Known Problems Sister      I have reviewed and agree with the history as documented  E-Cigarette/Vaping   • E-Cigarette Use Never User      E-Cigarette/Vaping Substances     Social History     Tobacco Use   • Smoking status: Never Smoker   • Smokeless tobacco: Never Used   Vaping Use   • Vaping Use: Never used   Substance Use Topics   • Alcohol use: Yes     Comment: Social   • Drug use: No       Review of Systems   Constitutional: Positive for fatigue  Negative for diaphoresis and fever  HENT: Negative for congestion, ear pain, nosebleeds and sore throat  Eyes: Negative for photophobia, pain, discharge and visual disturbance  Respiratory: Negative for cough, choking, chest tightness, shortness of breath and wheezing  Cardiovascular: Negative for chest pain and palpitations  Gastrointestinal: Negative for abdominal distention, abdominal pain, diarrhea and vomiting  Genitourinary: Negative for dysuria, flank pain and frequency  Musculoskeletal: Negative for back pain, gait problem and joint swelling  Skin: Positive for rash  Negative for color change  Neurological: Negative for dizziness, syncope and headaches  Psychiatric/Behavioral: Negative for behavioral problems and confusion  The patient is not nervous/anxious  All other systems reviewed and are negative  Physical Exam  Physical Exam  Vitals and nursing note reviewed     Constitutional:       General: He is not in acute distress  Appearance: He is well-developed  HENT:      Head: Normocephalic and atraumatic  Eyes:      General:         Right eye: No discharge  Left eye: No discharge  Conjunctiva/sclera: Conjunctivae normal    Cardiovascular:      Rate and Rhythm: Normal rate  Pulmonary:      Effort: Pulmonary effort is normal  No respiratory distress  Abdominal:      General: There is no distension  Tenderness: There is no guarding  Musculoskeletal:         General: No deformity  Cervical back: Normal range of motion and neck supple  Skin:     General: Skin is warm and dry  Findings: Rash present  Neurological:      Mental Status: He is alert and oriented to person, place, and time  Coordination: Coordination normal          Vital Signs  ED Triage Vitals [11/07/22 1936]   Temperature Pulse Respirations Blood Pressure SpO2   98 4 °F (36 9 °C) 60 20 139/73 96 %      Temp Source Heart Rate Source Patient Position - Orthostatic VS BP Location FiO2 (%)   Oral Monitor Sitting Left arm --      Pain Score       --           Vitals:    11/07/22 1936   BP: 139/73   Pulse: 60   Patient Position - Orthostatic VS: Sitting         Visual Acuity      ED Medications  Medications - No data to display    Diagnostic Studies  Results Reviewed     None                 No orders to display              Procedures  Procedures         ED Course                               SBIRT 20yo+    Flowsheet Row Most Recent Value   SBIRT (23 yo +)    In order to provide better care to our patients, we are screening all of our patients for alcohol and drug use  Would it be okay to ask you these screening questions? Yes Filed at: 11/07/2022 2028   Initial Alcohol Screen: US AUDIT-C     1  How often do you have a drink containing alcohol? 0 Filed at: 11/07/2022 2028   2  How many drinks containing alcohol do you have on a typical day you are drinking? 0 Filed at: 11/07/2022 2028   3a  Male UNDER 65:  How often do you have five or more drinks on one occasion? 0 Filed at: 11/07/2022 2028   3b  FEMALE Any Age, or MALE 65+: How often do you have 4 or more drinks on one occassion? 0 Filed at: 11/07/2022 2028   Audit-C Score 0 Filed at: 11/07/2022 2028   ALISSA: How many times in the past year have you    Used an illegal drug or used a prescription medication for non-medical reasons? Never Filed at: 11/07/2022 2028                    MDM  Number of Diagnoses or Management Options  Fatigue: new and requires workup  Rash: new and requires workup  Diagnosis management comments: We reviewed the patient's lab work together of which was pretty unremarkable  He had an elevated M pneumonia IgG but a negative IgM  Other labs included thyroid studies metabolic panel CBC which were also unremarkable  I had a long discussion with the patient regarding his workup for his broad complaints recommend a follow-up with his primary care on Wednesday as previously scheduled       Amount and/or Complexity of Data Reviewed  Clinical lab tests: reviewed  Independent visualization of images, tracings, or specimens: yes    Patient Progress  Patient progress: stable      Disposition  Final diagnoses:   Rash   Fatigue     Time reflects when diagnosis was documented in both MDM as applicable and the Disposition within this note     Time User Action Codes Description Comment    11/7/2022  9:14 PM Rosa Dias Add [R21] Rash     11/7/2022  9:14 PM Rosa Dias Add [R53 83] Fatigue       ED Disposition     ED Disposition   Discharge    Condition   Stable    Date/Time   Mon Nov 7, 2022  9:14 PM    Comment   Farshad Dumont Jr  discharge to home/self care  Follow-up Information    None         Patient's Medications   Discharge Prescriptions    No medications on file       No discharge procedures on file      PDMP Review     None          ED Provider  Electronically Signed by           ROLAN Rowley  11/07/22 1576

## 2022-12-01 ENCOUNTER — TELEPHONE (OUTPATIENT)
Dept: OBGYN CLINIC | Facility: MEDICAL CENTER | Age: 62
End: 2022-12-01

## 2022-12-07 ENCOUNTER — TELEPHONE (OUTPATIENT)
Dept: OBGYN CLINIC | Facility: HOSPITAL | Age: 62
End: 2022-12-07

## 2022-12-07 NOTE — TELEPHONE ENCOUNTER
Caller: Patient    Doctor: Antonia Ma    Reason for call: Patient called to advise [facility] is waiting for a call regarding Euflexxa.  He does not have their #    Call back#: [phone]

## 2022-12-16 ENCOUNTER — PROCEDURE VISIT (OUTPATIENT)
Dept: OBGYN CLINIC | Facility: MEDICAL CENTER | Age: 62
End: 2022-12-16

## 2022-12-16 VITALS
HEART RATE: 65 BPM | DIASTOLIC BLOOD PRESSURE: 73 MMHG | BODY MASS INDEX: 26.69 KG/M2 | SYSTOLIC BLOOD PRESSURE: 135 MMHG | HEIGHT: 70 IN | WEIGHT: 186.4 LBS

## 2022-12-16 DIAGNOSIS — G89.29 CHRONIC PAIN OF BOTH KNEES: Primary | ICD-10-CM

## 2022-12-16 DIAGNOSIS — M25.562 CHRONIC PAIN OF BOTH KNEES: Primary | ICD-10-CM

## 2022-12-16 DIAGNOSIS — M25.561 CHRONIC PAIN OF BOTH KNEES: Primary | ICD-10-CM

## 2022-12-16 DIAGNOSIS — M17.0 PRIMARY OSTEOARTHRITIS OF BOTH KNEES: ICD-10-CM

## 2022-12-16 RX ORDER — HYALURONATE SODIUM 10 MG/ML
20 SYRINGE (ML) INTRAARTICULAR
Status: COMPLETED | OUTPATIENT
Start: 2022-12-16 | End: 2022-12-16

## 2022-12-16 RX ADMIN — Medication 20 MG: at 15:45

## 2022-12-16 NOTE — PROGRESS NOTES
Assessment:  1  Chronic pain of both knees        2  Primary osteoarthritis of both knees            Plan:  1st bilateral Euflexxa  The patient was provided with 1st bilateral Euflexxa injections  The patient tolerated the procedure well  The patient should follow up in one week  To do next visit:  Return in about 1 week (around 12/23/2022) for 2nd bilateral Euflexxa   The above stated was discussed in layman's terms and the patient expressed understanding  All questions were answered to the patient's satisfaction  Scribe Attestation    I,:  Obdulio Javan am acting as a scribe while in the presence of the attending physician :       I,:  Harika Gautam MD personally performed the services described in this documentation    as scribed in my presence :             Subjective:   An Pineda  is a 58 y o  male who presents for 1st bilateral Euflexxa  Today he complains of bilateral generalized knee pain  Prolonged weight bearing and repetitive bending aggravates while rest alleviates  Review of systems negative unless otherwise specified in HPI    Past Medical History:   Diagnosis Date   • Disease of thyroid gland    • Essential hypertension    • GERD (gastroesophageal reflux disease)    • Hyperlipidemia    • Hypertension    • Hypothyroidism    • Thyroid nodule        History reviewed  No pertinent surgical history      Family History   Problem Relation Age of Onset   • Hypertension Mother    • Arthritis Father    • Hyperlipidemia Father    • Hypertension Father    • No Known Problems Sister        Social History     Occupational History     Comment: Employed   Tobacco Use   • Smoking status: Never   • Smokeless tobacco: Never   Vaping Use   • Vaping Use: Never used   Substance and Sexual Activity   • Alcohol use: Yes     Comment: Social   • Drug use: No   • Sexual activity: Yes     Partners: Female         Current Outpatient Medications:   •  ALPRAZolam (XANAX) 0 25 mg tablet, Take 0 25 mg by mouth daily as needed, Disp: , Rfl:   •  ascorbic acid (VITAMIN C) 1000 MG tablet, Take 1,000 mg by mouth daily, Disp: , Rfl:   •  levofloxacin (LEVAQUIN) 500 mg tablet, TAKE ONE TABLET BY MOUTH EVERY 24 HOURS FOR 10 DAYS, Disp: , Rfl:   •  levothyroxine 88 mcg tablet, , Disp: , Rfl:   •  methylPREDNISolone 4 MG tablet therapy pack, Use as directed on package, Disp: 1 each, Rfl: 0  •  metroNIDAZOLE (FLAGYL) 500 mg tablet, TAKE ONE TABLET BY MOUTH EVERY 8 HOURS FOR 10 DAYS, Disp: , Rfl:   •  Multiple Vitamin (MULTIVITAMIN) tablet, Take 1 tablet by mouth daily, Disp: , Rfl:   •  aspirin 81 MG tablet, Take 2 tablets by mouth daily (Patient not taking: Reported on 6/10/2022), Disp: , Rfl:   •  simvastatin (ZOCOR) 20 mg tablet, TAKE 1 TABLET BY MOUTH DAILY, Disp: 90 tablet, Rfl: 0  •  valsartan-hydrochlorothiazide (DIOVAN-HCT) 160-12 5 MG per tablet, TAKE 1 TABLET BY MOUTH DAILY, Disp: 90 tablet, Rfl: 0    Allergies   Allergen Reactions   • Lisinopril Cough   • Penicillins Rash     Per pt was told this when young             Vitals:    12/16/22 1536   BP: 135/73   Pulse: 65       Objective:  Physical exam  · General: Awake, Alert, Oriented  · Eyes: Pupils equal, round and reactive to light  · Heart: regular rate and rhythm  · Lungs: No audible wheezing  · Abdomen: soft                    Ortho Exam  Bilateral knees:  No erythema or ecchymosis  No effusion or swelling  Normal strength  Good ROM with crepitus   Calf compartments soft and supple  Sensation intact  Toes are warm sensate and mobile      Diagnostics, reviewed and taken today if performed as documented:    None performed     Procedures, if performed today:    Large joint arthrocentesis: R knee  Universal Protocol:  Consent: Verbal consent obtained    Risks and benefits: risks, benefits and alternatives were discussed  Consent given by: patient  Time out: Immediately prior to procedure a "time out" was called to verify the correct patient, procedure, equipment, support staff and site/side marked as required  Timeout called at: 12/16/2022 3:43 PM   Patient understanding: patient states understanding of the procedure being performed  Patient identity confirmed: verbally with patient    Supporting Documentation  Indications: pain   Procedure Details  Location: knee - R knee  Preparation: Patient was prepped and draped in the usual sterile fashion  Needle size: 22 G  Ultrasound guidance: no  Approach: anterolateral  Medications administered: 20 mg Sodium Hyaluronate 20 MG/2ML    Patient tolerance: patient tolerated the procedure well with no immediate complications  Dressing:  Sterile dressing applied    Large joint arthrocentesis: L knee  Universal Protocol:  Consent: Verbal consent obtained  Risks and benefits: risks, benefits and alternatives were discussed  Consent given by: patient  Time out: Immediately prior to procedure a "time out" was called to verify the correct patient, procedure, equipment, support staff and site/side marked as required  Timeout called at: 12/16/2022 3:44 PM   Patient understanding: patient states understanding of the procedure being performed  Patient identity confirmed: verbally with patient    Supporting Documentation  Indications: pain   Procedure Details  Location: knee - L knee  Preparation: Patient was prepped and draped in the usual sterile fashion  Needle size: 22 G  Ultrasound guidance: no  Approach: anterolateral  Medications administered: 20 mg Sodium Hyaluronate 20 MG/2ML    Patient tolerance: patient tolerated the procedure well with no immediate complications  Dressing:  Sterile dressing applied            Portions of the record may have been created with voice recognition software  Occasional wrong word or "sound a like" substitutions may have occurred due to the inherent limitations of voice recognition software    Read the chart carefully and recognize, using context, where substitutions have occurred

## 2022-12-23 ENCOUNTER — PROCEDURE VISIT (OUTPATIENT)
Dept: OBGYN CLINIC | Facility: MEDICAL CENTER | Age: 62
End: 2022-12-23

## 2022-12-23 ENCOUNTER — TELEPHONE (OUTPATIENT)
Dept: INFECTIOUS DISEASES | Facility: CLINIC | Age: 62
End: 2022-12-23

## 2022-12-23 VITALS
HEIGHT: 70 IN | DIASTOLIC BLOOD PRESSURE: 80 MMHG | SYSTOLIC BLOOD PRESSURE: 159 MMHG | HEART RATE: 49 BPM | BODY MASS INDEX: 26.63 KG/M2 | WEIGHT: 186 LBS

## 2022-12-23 DIAGNOSIS — G89.29 CHRONIC PAIN OF BOTH KNEES: ICD-10-CM

## 2022-12-23 DIAGNOSIS — M25.561 CHRONIC PAIN OF BOTH KNEES: ICD-10-CM

## 2022-12-23 DIAGNOSIS — M17.0 PRIMARY OSTEOARTHRITIS OF BOTH KNEES: Primary | ICD-10-CM

## 2022-12-23 DIAGNOSIS — M25.562 CHRONIC PAIN OF BOTH KNEES: ICD-10-CM

## 2022-12-23 RX ORDER — HYALURONATE SODIUM 10 MG/ML
20 SYRINGE (ML) INTRAARTICULAR
Status: COMPLETED | OUTPATIENT
Start: 2022-12-23 | End: 2022-12-23

## 2022-12-23 RX ADMIN — Medication 20 MG: at 14:36

## 2022-12-23 RX ADMIN — Medication 20 MG: at 14:31

## 2022-12-23 NOTE — TELEPHONE ENCOUNTER
Patient calls office to schedule appointment  Referral is from provider not affiliated with [facility] requested that the patient have the referral faxed to our office and informed him that out office nurse will review it and call him back to schedule  Provided office fax number to patient  He states understanding and gives thanks

## 2022-12-23 NOTE — PROGRESS NOTES
ASSESSMENT/PLAN:    Diagnoses and all orders for this visit:    Primary osteoarthritis of both knees    Chronic pain of both knees    Patient was provided with Euflexxa #2 today into his bilateral knees  This is documented appropriately below  Patient tolerated the injection well  Post injection protocol was reviewed with the patient today  He understood and all questions were answered  Return in about 1 week (around 12/30/2022) for Euflexxa #3 into bilateral knees  _____________________________________________________  CHIEF COMPLAINT:  No chief complaint on file  SUBJECTIVE:  Latisha Silva  is a 58y o  year old male who presents for viscosupplementation (Euflexxa#2) into his bilateral knees  Patient reports continued generalized pain about both knees  Pain is worse with prolonged ambulating and standing  No numbness or tingling  No fevers or chills  PAST MEDICAL HISTORY:  Past Medical History:   Diagnosis Date   • Disease of thyroid gland    • Essential hypertension    • GERD (gastroesophageal reflux disease)    • Hyperlipidemia    • Hypertension    • Hypothyroidism    • Thyroid nodule        PAST SURGICAL HISTORY:  No past surgical history on file      FAMILY HISTORY:  Family History   Problem Relation Age of Onset   • Hypertension Mother    • Arthritis Father    • Hyperlipidemia Father    • Hypertension Father    • No Known Problems Sister        SOCIAL HISTORY:  Social History     Tobacco Use   • Smoking status: Never   • Smokeless tobacco: Never   Vaping Use   • Vaping Use: Never used   Substance Use Topics   • Alcohol use: Yes     Comment: Social   • Drug use: No       MEDICATIONS:    Current Outpatient Medications:   •  ALPRAZolam (XANAX) 0 25 mg tablet, Take 0 25 mg by mouth daily as needed, Disp: , Rfl:   •  ascorbic acid (VITAMIN C) 1000 MG tablet, Take 1,000 mg by mouth daily, Disp: , Rfl:   •  aspirin 81 MG tablet, Take 2 tablets by mouth daily (Patient not taking: Reported on 6/10/2022), Disp: , Rfl:   •  levofloxacin (LEVAQUIN) 500 mg tablet, TAKE ONE TABLET BY MOUTH EVERY 24 HOURS FOR 10 DAYS, Disp: , Rfl:   •  levothyroxine 88 mcg tablet, , Disp: , Rfl:   •  methylPREDNISolone 4 MG tablet therapy pack, Use as directed on package, Disp: 1 each, Rfl: 0  •  metroNIDAZOLE (FLAGYL) 500 mg tablet, TAKE ONE TABLET BY MOUTH EVERY 8 HOURS FOR 10 DAYS, Disp: , Rfl:   •  Multiple Vitamin (MULTIVITAMIN) tablet, Take 1 tablet by mouth daily, Disp: , Rfl:   •  simvastatin (ZOCOR) 20 mg tablet, TAKE 1 TABLET BY MOUTH DAILY, Disp: 90 tablet, Rfl: 0  •  valsartan-hydrochlorothiazide (DIOVAN-HCT) 160-12 5 MG per tablet, TAKE 1 TABLET BY MOUTH DAILY, Disp: 90 tablet, Rfl: 0    ALLERGIES:  Allergies   Allergen Reactions   • Lisinopril Cough   • Penicillins Rash     Per pt was told this when young        REVIEW OF SYSTEMS:  Pertinent items are noted in HPI  A comprehensive review of systems was negative   _____________________________________________________  PHYSICAL EXAMINATION:  General: well developed and well nourished, alert, oriented times 3 and appears comfortable  Psychiatric: Normal  HEENT:  Normocephalic, atraumatic  Cardiovascular:  Regular  Pulmonary: No wheezing or stridor  Skin: No masses, erthema, lacerations, fluctation, ulcerations  Neurovascular: Motor and sensory exams are grossly intact, +2 PT pulse       MUSCULOSKELETAL EXAMINATION:    Bilateral lower extremities are neurovascularly intact  Toes are pink and mobile   Compartments are soft  No warmth, erythema or ecchymosis  ROM of knees are from 5-115 degrees  Negative Lachman, drawer or pivot shift  No medial instability  Medial joint line tenderness, slight lateral joint line tenderness  Patellofemoral crepitation    _____________________________________________________  STUDIES REVIEWED:  No Studies to review      PROCEDURES PERFORMED:  Large joint arthrocentesis: bilateral knee  Universal Protocol:  Consent: Verbal consent obtained  Risks and benefits: risks, benefits and alternatives were discussed  Consent given by: patient  Site marked: the operative site was marked  Radiology Images displayed and confirmed   If images not available, report reviewed: imaging studies available  Patient identity confirmed: verbally with patient    Supporting Documentation  Indications: pain and diagnostic evaluation   Procedure Details  Location: knee - bilateral knee  Preparation: Patient was prepped and draped in the usual sterile fashion  Needle size: 22 G  Ultrasound guidance: no  Approach: lateral    Medications (Right): 20 mg Sodium Hyaluronate 20 MG/2MLMedications (Left): 20 mg Sodium Hyaluronate 20 MG/2ML   Patient tolerance: patient tolerated the procedure well with no immediate complications  Dressing:  Sterile dressing applied            Scribe Attestation    I,:  Bryon Ohara am acting as a scribe while in the presence of the attending physician :       I,:  Jin Cesar MD personally performed the services described in this documentation    as scribed in my presence :

## 2022-12-30 ENCOUNTER — PROCEDURE VISIT (OUTPATIENT)
Dept: OBGYN CLINIC | Facility: MEDICAL CENTER | Age: 62
End: 2022-12-30

## 2022-12-30 VITALS
SYSTOLIC BLOOD PRESSURE: 123 MMHG | WEIGHT: 186 LBS | HEIGHT: 70 IN | BODY MASS INDEX: 26.63 KG/M2 | DIASTOLIC BLOOD PRESSURE: 75 MMHG | HEART RATE: 59 BPM

## 2022-12-30 DIAGNOSIS — G89.29 CHRONIC PAIN OF BOTH KNEES: Primary | ICD-10-CM

## 2022-12-30 DIAGNOSIS — M17.0 PRIMARY OSTEOARTHRITIS OF BOTH KNEES: ICD-10-CM

## 2022-12-30 DIAGNOSIS — M25.561 CHRONIC PAIN OF BOTH KNEES: Primary | ICD-10-CM

## 2022-12-30 DIAGNOSIS — M25.562 CHRONIC PAIN OF BOTH KNEES: Primary | ICD-10-CM

## 2022-12-30 RX ORDER — HYALURONATE SODIUM 10 MG/ML
20 SYRINGE (ML) INTRAARTICULAR
Status: COMPLETED | OUTPATIENT
Start: 2022-12-30 | End: 2022-12-30

## 2022-12-30 RX ADMIN — Medication 20 MG: at 14:47

## 2022-12-30 NOTE — PROGRESS NOTES
Assessment:  1  Chronic pain of both knees        2  Primary osteoarthritis of both knees            Plan:  3rd bilateral Euflexxa  The patient was provided with 3rd bilateral Euflexxa injections  The patient tolerated the procedure well  The patient should follow up in 3 months  To do next visit:  Return in about 3 months (around 3/30/2023)  The above stated was discussed in layman's terms and the patient expressed understanding  All questions were answered to the patient's satisfaction  Scribe Attestation    I,:  Jovan Sprague am acting as a scribe while in the presence of the attending physician :       I,:  Lloyd Councilman, MD personally performed the services described in this documentation    as scribed in my presence :             Subjective:   Cookie Lindsay  is a 58 y o  male who presents for 3rd bilateral Euflexxa  Today he complains of decreasing bilateral generalized knee pain  Prolonged weight bearing and repetitive bending aggravates while rest alleviates  Review of systems negative unless otherwise specified in HPI    Past Medical History:   Diagnosis Date   • Disease of thyroid gland    • Essential hypertension    • GERD (gastroesophageal reflux disease)    • Hyperlipidemia    • Hypertension    • Hypothyroidism    • Thyroid nodule        History reviewed  No pertinent surgical history      Family History   Problem Relation Age of Onset   • Hypertension Mother    • Arthritis Father    • Hyperlipidemia Father    • Hypertension Father    • No Known Problems Sister        Social History     Occupational History     Comment: Employed   Tobacco Use   • Smoking status: Never   • Smokeless tobacco: Never   Vaping Use   • Vaping Use: Never used   Substance and Sexual Activity   • Alcohol use: Yes     Comment: Social   • Drug use: No   • Sexual activity: Yes     Partners: Female         Current Outpatient Medications:   •  ALPRAZolam (XANAX) 0 25 mg tablet, Take 0 25 mg by mouth daily as needed, Disp: , Rfl:   •  ascorbic acid (VITAMIN C) 1000 MG tablet, Take 1,000 mg by mouth daily, Disp: , Rfl:   •  levofloxacin (LEVAQUIN) 500 mg tablet, TAKE ONE TABLET BY MOUTH EVERY 24 HOURS FOR 10 DAYS, Disp: , Rfl:   •  levothyroxine 88 mcg tablet, , Disp: , Rfl:   •  methylPREDNISolone 4 MG tablet therapy pack, Use as directed on package, Disp: 1 each, Rfl: 0  •  metroNIDAZOLE (FLAGYL) 500 mg tablet, TAKE ONE TABLET BY MOUTH EVERY 8 HOURS FOR 10 DAYS, Disp: , Rfl:   •  Multiple Vitamin (MULTIVITAMIN) tablet, Take 1 tablet by mouth daily, Disp: , Rfl:   •  aspirin 81 MG tablet, Take 2 tablets by mouth daily (Patient not taking: Reported on 6/10/2022), Disp: , Rfl:   •  simvastatin (ZOCOR) 20 mg tablet, TAKE 1 TABLET BY MOUTH DAILY, Disp: 90 tablet, Rfl: 0  •  valsartan-hydrochlorothiazide (DIOVAN-HCT) 160-12 5 MG per tablet, TAKE 1 TABLET BY MOUTH DAILY, Disp: 90 tablet, Rfl: 0    Allergies   Allergen Reactions   • Lisinopril Cough   • Penicillins Rash     Per pt was told this when young             Vitals:    12/30/22 1438   BP: 123/75   Pulse: 59       Objective:  Physical exam  · General: Awake, Alert, Oriented  · Eyes: Pupils equal, round and reactive to light  · Heart: regular rate and rhythm  · Lungs: No audible wheezing  · Abdomen: soft                    Ortho Exam  Bilateral knees:  No erythema or ecchymosis  No effusion or swelling  Normal strength  Good ROM with crepitus   Calf compartments soft and supple  Sensation intact  Toes are warm sensate and mobile      Diagnostics, reviewed and taken today if performed as documented:    None performed     Procedures, if performed today:    Large joint arthrocentesis: R knee  Universal Protocol:  Consent: Verbal consent obtained    Risks and benefits: risks, benefits and alternatives were discussed  Consent given by: patient  Time out: Immediately prior to procedure a "time out" was called to verify the correct patient, procedure, equipment, support staff and site/side marked as required  Timeout called at: 12/30/2022 2:45 PM   Patient understanding: patient states understanding of the procedure being performed  Patient identity confirmed: verbally with patient    Supporting Documentation  Indications: pain   Procedure Details  Location: knee - R knee  Preparation: Patient was prepped and draped in the usual sterile fashion  Needle size: 22 G  Ultrasound guidance: no  Approach: anterolateral  Medications administered: 20 mg Sodium Hyaluronate 20 MG/2ML    Patient tolerance: patient tolerated the procedure well with no immediate complications  Dressing:  Sterile dressing applied    Large joint arthrocentesis: L knee  Universal Protocol:  Consent: Verbal consent obtained  Risks and benefits: risks, benefits and alternatives were discussed  Consent given by: patient  Time out: Immediately prior to procedure a "time out" was called to verify the correct patient, procedure, equipment, support staff and site/side marked as required  Timeout called at: 12/30/2022 2:45 PM   Patient understanding: patient states understanding of the procedure being performed  Patient identity confirmed: verbally with patient    Supporting Documentation  Indications: pain   Procedure Details  Location: knee - L knee  Preparation: Patient was prepped and draped in the usual sterile fashion  Needle size: 22 G  Ultrasound guidance: no  Approach: anterolateral  Medications administered: 20 mg Sodium Hyaluronate 20 MG/2ML    Patient tolerance: patient tolerated the procedure well with no immediate complications  Dressing:  Sterile dressing applied            Portions of the record may have been created with voice recognition software  Occasional wrong word or "sound a like" substitutions may have occurred due to the inherent limitations of voice recognition software  Read the chart carefully and recognize, using context, where substitutions have occurred

## 2023-07-11 ENCOUNTER — TELEPHONE (OUTPATIENT)
Dept: OBGYN CLINIC | Facility: MEDICAL CENTER | Age: 63
End: 2023-07-11

## 2023-07-11 DIAGNOSIS — M17.0 PRIMARY OSTEOARTHRITIS OF BOTH KNEES: Primary | ICD-10-CM

## 2023-07-11 DIAGNOSIS — M25.561 CHRONIC PAIN OF BOTH KNEES: ICD-10-CM

## 2023-07-11 DIAGNOSIS — M25.562 CHRONIC PAIN OF BOTH KNEES: ICD-10-CM

## 2023-07-11 DIAGNOSIS — G89.29 CHRONIC PAIN OF BOTH KNEES: ICD-10-CM

## 2023-07-11 NOTE — TELEPHONE ENCOUNTER
Caller: Patient  Doctor/office: Cookie Griffiths  #: [phone]      [employer] called to start auth/delivery for EUFLEXXA injections    Body Part: Bilateral knees  Pain Level (scale 1-10): 5-6  Date of last Gel Injection: 12/30/23    Educated patient on Visco procedure.  Medications must first be authorized and delivered to our office BEFORE we can schedule

## 2023-07-25 ENCOUNTER — TELEPHONE (OUTPATIENT)
Dept: OBGYN CLINIC | Facility: HOSPITAL | Age: 63
End: 2023-07-25

## 2023-07-27 ENCOUNTER — TELEPHONE (OUTPATIENT)
Dept: OBGYN CLINIC | Facility: HOSPITAL | Age: 63
End: 2023-07-27

## 2023-07-27 NOTE — TELEPHONE ENCOUNTER
Caller: patient   Doctor: Kristy Miner    Reason for call: called to schedule VISCO, declined all [facility] and [place] date/times offered    Call back#:

## 2023-07-27 NOTE — TELEPHONE ENCOUNTER
Caller: Patient    Doctor: Rikki Schuster    Reason for call:     Patient calling back about scheduling his Visco injections, he is stating he cannot wait to have   His injections at the later availability in Dr schedule, he is asking if someone else can do his injections or  If he could schedule with a new Dr in [place] since he lives there and follow up there for the future. Who would the doctor recommend?     Call back#: [phone]

## 2023-08-01 ENCOUNTER — TELEPHONE (OUTPATIENT)
Dept: OBGYN CLINIC | Facility: HOSPITAL | Age: 63
End: 2023-08-01

## 2023-08-01 NOTE — TELEPHONE ENCOUNTER
Caller: Patient    Doctor: Henrietta Piper    Reason for call: Patient transferring to a different network closer to his home. Would like to  his Euflexxa injections to take with him. Is this allowed? Please advise.     Call back#: [phone]

## 2023-08-02 ENCOUNTER — TELEPHONE (OUTPATIENT)
Dept: OBGYN CLINIC | Facility: CLINIC | Age: 63
End: 2023-08-02

## 2023-08-02 NOTE — TELEPHONE ENCOUNTER
Patient stopped by office stating he spoke to a woman named "Keyla Dexter" who told him he can stop by the [facility] location to  his Euflexxa series. I did not give patient these shots, he was very upset I would not give him his injections. He does not have any apts set up with any Drs here at [facility] for these injections to be given.

## 2023-08-08 ENCOUNTER — TELEPHONE (OUTPATIENT)
Dept: OBGYN CLINIC | Facility: CLINIC | Age: 63
End: 2023-08-08

## 2023-08-08 NOTE — TELEPHONE ENCOUNTER
Per BR ok to give patient Euflexxa.  Patient arrived and picked up Euflexxa at [facility] on 8.3.23 in pm.

## 2024-05-14 ENCOUNTER — OFFICE VISIT (OUTPATIENT)
Dept: CARDIOLOGY CLINIC | Facility: CLINIC | Age: 64
End: 2024-05-14
Payer: COMMERCIAL

## 2024-05-14 VITALS
HEIGHT: 70 IN | HEART RATE: 65 BPM | SYSTOLIC BLOOD PRESSURE: 140 MMHG | OXYGEN SATURATION: 96 % | WEIGHT: 199 LBS | DIASTOLIC BLOOD PRESSURE: 80 MMHG | BODY MASS INDEX: 28.49 KG/M2 | RESPIRATION RATE: 16 BRPM

## 2024-05-14 DIAGNOSIS — I10 PRIMARY HYPERTENSION: ICD-10-CM

## 2024-05-14 DIAGNOSIS — I11.9 HYPERTENSIVE HEART DISEASE WITHOUT HEART FAILURE: ICD-10-CM

## 2024-05-14 DIAGNOSIS — Z01.810 PREOP CARDIOVASCULAR EXAM: Primary | ICD-10-CM

## 2024-05-14 PROCEDURE — 93000 ELECTROCARDIOGRAM COMPLETE: CPT | Performed by: INTERNAL MEDICINE

## 2024-05-14 PROCEDURE — 99204 OFFICE O/P NEW MOD 45 MIN: CPT | Performed by: INTERNAL MEDICINE

## 2024-05-14 RX ORDER — OLMESARTAN MEDOXOMIL 20 MG/1
20 TABLET ORAL DAILY
Start: 2024-05-14

## 2024-05-14 NOTE — PROGRESS NOTES
CARDIOLOGY CONSULTATION  [facility]  [address], [place] [id number]  Tel: [phone]      NAME: Magno Lau Jr.  AGE: 63 y.o.  SEX: male  : 1960  MRN: [id number]      Chief Complaint:  Chief Complaint   Patient presents with    Follow-up         History of Present Illness:   63-year-old male with hypertension, bilateral knee osteoarthritis here for preoperative cardiac risk assessment prior to bilateral knee surgeries. Pt States he is doing well from cardiac stand point and denies chest pain / pressure, SOB, palpitations, lightheadedness, syncope, swelling feet, orthopnea, PND, claudication.    Essential hypertension -  Has been hypertensive for many years.  Taking medications regularly.  Denies lightheadedness, headache, medication side effects.      Denies history of CAD, CHF, CKD, TIA, CVA, DM  States he can climb 1 flight of steps without any shortness of breath or chest pain    Past Medical History:  Past Medical History:   Diagnosis Date    Disease of thyroid gland     Essential hypertension     GERD (gastroesophageal reflux disease)     Hyperlipidemia     Hypertension     Hypothyroidism     Thyroid nodule          Past Surgical History:  No past surgical history on file.      Family History:  Family History   Problem Relation Age of Onset    Hypertension Mother     Arthritis Father     Hyperlipidemia Father     Hypertension Father     No Known Problems Sister          Social History:  Social History     Socioeconomic History    Marital status: /Civil Union     Spouse name: None    Number of children: None    Years of education: None    Highest education level: None   Occupational History     Comment: Employed   Tobacco Use    Smoking status: Never    Smokeless tobacco: Never   Vaping Use    Vaping status: Never Used   Substance and Sexual Activity    Alcohol use: Yes     Comment: Social    Drug use: No     Sexual activity: Yes     Partners: Female   Other Topics Concern    None   Social History Narrative    Always uses  Seat belt    Daily Caffeine Consumption    Seeing a dentist     Social Determinants of Health     Financial Resource Strain: Not on file   Food Insecurity: Not on file   Transportation Needs: Not on file   Physical Activity: Not on file   Stress: Not on file   Social Connections: Not on file   Intimate Partner Violence: Not on file   Housing Stability: Not on file         Active Problems:  Patient Active Problem List   Diagnosis    Acquired hypothyroidism    Essential hypertension    GERD (gastroesophageal reflux disease)    Mixed hyperlipidemia    Thyroid nodule    Primary osteoarthritis of both knees    Chronic pain of left knee    Primary osteoarthritis of left knee    Radiculopathy, lumbar region         The following portions of the patient's history were reviewed and updated as appropriate: past medical history, past surgical history, past family history,  past social history, current medications, allergies and problem list.      Review of Systems:  Constitutional: Denies fever, chills  Eyes: Denies eye redness, eye discharge  ENT: Denies hearing loss, sneezing, nasal discharge, sore throat   Respiratory: Denies cough, expectoration, shortness of breath  Cardiovascular: Denies chest pain, palpitations, lower extremity swelling  Gastrointestinal: Denies abdominal pain, nausea, vomiting, diarrhea  Genito-Urinary: Denies dysuria, incontinence  Musculoskeletal: +b/l knee pains  Neurologic: Denies lightheadedness, syncope, headache, seizures  Endocrine: Denies polydipsia, temperature intolerance  Allergy and Immunology: Denies hives, insect bite sensitivity  Hematological and Lymphatic: Denies bleeding problems, swollen glands   Psychological: Denies depression, suicidal ideation, anxiety, panic  Dermatological: Denies pruritus, rash, skin lesion changes      Vitals:  Vitals:    05/14/24 1410   BP:  "140/80   Pulse: 65   Resp: 16   SpO2: 96%       Body mass index is 28.55 kg/m².    Weight (last 2 days)       Date/Time Weight    05/14/24 1410 90.3 (199)              Physical Examination:  General: Patient is not in acute distress. Awake, alert, oriented in time, place and person. Responding to commands  Head: Normocephalic. Atraumatic  Eyes: Both pupils normal sized, round and reactive to light. Nonicteric  ENT: Normal external ear canals  Neck: Supple. JVP not raised. Trachea central. No thyromegaly  Lungs: Bilateral bronchovascular breath sounds with no crackles or rhonchi  Chest wall: No tenderness  Cardiovascular: RRR. S1 and S2 normal. No murmur, rub or gallop  Gastrointestinal: Abdomen soft, nontender. No guarding or rigidity. Liver and spleen not palpable. Bowel sounds present  Neurologic: Patient is awake, alert, oriented in time, place and person. Responding to commands. Moving all extremities  Integumentary:  No skin rash  Lymphatic: No cervical lymphadenopathy  Back: Symmetric. No CVA tenderness  Extremities: No clubbing, cyanosis or edema      Laboratory Results:  CBC with diff:   Lab Results   Component Value Date    WBC 4.75 09/15/2022    WBC 0-5 11/11/2017    RBC 5.44 09/15/2022    HGB 16.6 09/15/2022    HCT 47.4 09/15/2022    MCV 87 09/15/2022    MCH 30.5 09/15/2022    RDW 13.1 09/15/2022     09/15/2022       CMP:  Lab Results   Component Value Date    CREATININE 0.77 09/15/2022    BUN 13 09/15/2022    BUN 15 04/19/2019    K 4.5 09/15/2022    K 4.2 04/19/2019    CL 97 09/15/2022    CL 98 04/19/2019    CO2 31 09/15/2022    CO2 28 04/19/2019    ALKPHOS 67 09/15/2022    ALT 48 09/15/2022    ALT 40 04/19/2019    AST 62 (H) 09/15/2022    AST 34 04/19/2019       No results found for: \"HGBA1C\", \"MG\", \"PHOS\"    No results found for: \"TROPONINI\", \"CKMB\", \"CKTOTAL\"    Lipid Profile:   No results found for: \"CHOL\"  Lab Results   Component Value Date    HDL 51 04/19/2019    HDL 50 11/26/2018     Lab " Results   Component Value Date    LDLCALC 85 04/19/2019    LDLCALC 86 11/26/2018     Lab Results   Component Value Date    TRIG 81 04/19/2019    TRIG 92 11/26/2018         EKG: Reviewed by me.  5/14/2024.  Normal sinus rhythm.  Possible left atrial enlargement.  LAD.      Medications:    Current Outpatient Medications:     ALPRAZolam (XANAX) 0.25 mg tablet, Take 0.25 mg by mouth daily as needed, Disp: , Rfl:     ascorbic acid (VITAMIN C) 1000 MG tablet, Take 1,000 mg by mouth daily, Disp: , Rfl:     levofloxacin (LEVAQUIN) 500 mg tablet, TAKE ONE TABLET BY MOUTH EVERY 24 HOURS FOR 10 DAYS, Disp: , Rfl:     levothyroxine 88 mcg tablet, , Disp: , Rfl:     methylPREDNISolone 4 MG tablet therapy pack, Use as directed on package, Disp: 1 each, Rfl: 0    metroNIDAZOLE (FLAGYL) 500 mg tablet, TAKE ONE TABLET BY MOUTH EVERY 8 HOURS FOR 10 DAYS, Disp: , Rfl:     Multiple Vitamin (MULTIVITAMIN) tablet, Take 1 tablet by mouth daily, Disp: , Rfl:     olmesartan (BENICAR) 20 mg tablet, Take 1 tablet (20 mg total) by mouth daily, Disp: , Rfl:       Allergies:  Allergies   Allergen Reactions    Lisinopril Cough    Penicillins Rash     Per pt was told this when young          Assessment and Plan:  1. Preop cardiovascular exam  EKG done in the clinic reviewed with the patient.  2D echocardiogram ordered for possible hypertensive heart disease and the left atrial enlargement seen on the EKG  - POCT ECG  - Echo complete w/ contrast if indicated; Future    2. Primary hypertension  Blood pressure stable.  Continue olmesartan 20 mg daily.  Continue to monitor BP at home and call if abnormal  - olmesartan (BENICAR) 20 mg tablet; Take 1 tablet (20 mg total) by mouth daily    3. Hypertensive heart disease without heart failure  2D echocardiogram ordered  - Echo complete w/ contrast if indicated; Future      Recommend aggressive risk factor modification and therapeutic lifestyle changes.  Low-salt, low-calorie, low-fat, low-cholesterol diet  with regular exercise and to optimize weight.  I will defer the ordering and monitoring of necessity lab studies to you, but I am available and happy to review and manage any of the data at your request in the future.    Discussed concepts of atherosclerosis, including signs and symptoms of cardiac disease.    Previous studies were reviewed.    Safety measures were reviewed.  Questions were entertained and answered.  Patient was advised to report any problems requiring medical attention.    Follow-up with PCP and appropriate specialist and lab work as discussed.    Return for follow up visit as scheduled or earlier, if needed.  Thank you for allowing me to participate in the care and evaluation of your patient.  Should you have any questions, please feel free to contact me.    Ari Galdamez MD  5/14/2024,2:31 PM

## 2024-05-16 ENCOUNTER — TELEPHONE (OUTPATIENT)
Age: 64
End: 2024-05-16

## 2024-05-16 NOTE — TELEPHONE ENCOUNTER
Patient was looking to schedule NP appointment with Endocrinology. Gave him next available at the location he was interested in. Patient decided he is going to look elsewhere at this time as he is hoping to get in sooner.

## 2024-05-24 ENCOUNTER — HOSPITAL ENCOUNTER (OUTPATIENT)
Dept: NON INVASIVE DIAGNOSTICS | Facility: CLINIC | Age: 64
Discharge: HOME | End: 2024-05-24
Payer: COMMERCIAL

## 2024-05-24 VITALS
BODY MASS INDEX: 28.49 KG/M2 | SYSTOLIC BLOOD PRESSURE: 140 MMHG | HEIGHT: 70 IN | WEIGHT: 199 LBS | DIASTOLIC BLOOD PRESSURE: 80 MMHG | HEART RATE: 61 BPM

## 2024-05-24 DIAGNOSIS — Z01.810 PREOP CARDIOVASCULAR EXAM: ICD-10-CM

## 2024-05-24 DIAGNOSIS — I11.9 HYPERTENSIVE HEART DISEASE WITHOUT HEART FAILURE: ICD-10-CM

## 2024-05-24 LAB
AORTIC ROOT: 3.2 CM
APICAL FOUR CHAMBER EJECTION FRACTION: 60 %
ASCENDING AORTA: 2.8 CM
BSA FOR ECHO PROCEDURE: 2.08 M2
E WAVE DECELERATION TIME: 222 MS
E/A RATIO: 0.82
FRACTIONAL SHORTENING: 33 (ref 28–44)
INTERVENTRICULAR SEPTUM IN DIASTOLE (PARASTERNAL SHORT AXIS VIEW): 1 CM
INTERVENTRICULAR SEPTUM: 1 CM (ref 0.6–1.1)
LAAS-AP2: 14.8 CM2
LAAS-AP4: 14.9 CM2
LEFT ATRIUM SIZE: 3.2 CM
LEFT ATRIUM VOLUME (MOD BIPLANE): 37 ML
LEFT ATRIUM VOLUME INDEX (MOD BIPLANE): 17.8 ML/M2
LEFT INTERNAL DIMENSION IN SYSTOLE: 3.4 CM (ref 2.1–4)
LEFT VENTRICULAR INTERNAL DIMENSION IN DIASTOLE: 5.1 CM (ref 3.5–6)
LEFT VENTRICULAR POSTERIOR WALL IN END DIASTOLE: 1.1 CM
LEFT VENTRICULAR STROKE VOLUME: 75 ML
LVSV (TEICH): 75 ML
MV E'TISSUE VEL-SEP: 8 CM/S
MV PEAK A VEL: 1.07 M/S
MV PEAK E VEL: 88 CM/S
MV STENOSIS PRESSURE HALF TIME: 64 MS
MV VALVE AREA P 1/2 METHOD: 3.44
RIGHT ATRIUM AREA SYSTOLE A4C: 16.5 CM2
RIGHT VENTRICLE ID DIMENSION: 3.9 CM
SL CV LEFT ATRIUM LENGTH A2C: 5 CM
SL CV LV EF: 60
SL CV PED ECHO LEFT VENTRICLE DIASTOLIC VOLUME (MOD BIPLANE) 2D: 123 ML
SL CV PED ECHO LEFT VENTRICLE SYSTOLIC VOLUME (MOD BIPLANE) 2D: 48 ML
TRICUSPID ANNULAR PLANE SYSTOLIC EXCURSION: 2.3 CM

## 2024-05-24 PROCEDURE — 93306 TTE W/DOPPLER COMPLETE: CPT | Performed by: INTERNAL MEDICINE

## 2024-05-24 PROCEDURE — 93306 TTE W/DOPPLER COMPLETE: CPT

## 2024-05-28 ENCOUNTER — TELEPHONE (OUTPATIENT)
Dept: CARDIOLOGY CLINIC | Facility: CLINIC | Age: 64
End: 2024-05-28

## 2024-05-28 NOTE — TELEPHONE ENCOUNTER
----- Message from Ari Galdamez MD sent at 5/24/2024  5:21 PM EDT -----  Please call and inform the patient that the echo was overall normal.

## 2024-06-04 ENCOUNTER — HOSPITAL ENCOUNTER (OUTPATIENT)
Dept: ULTRASOUND IMAGING | Facility: HOSPITAL | Age: 64
Discharge: HOME | End: 2024-06-04
Payer: COMMERCIAL

## 2024-06-04 DIAGNOSIS — E04.9 NONTOXIC GOITER, UNSPECIFIED: ICD-10-CM

## 2024-06-04 PROCEDURE — 76536 US EXAM OF HEAD AND NECK: CPT

## 2024-06-10 ENCOUNTER — TELEPHONE (OUTPATIENT)
Age: 64
End: 2024-06-10

## 2024-06-10 NOTE — TELEPHONE ENCOUNTER
Caller: Araceli/ [facility]     Doctor: Dr. Galdamez    Reason for call: Araceli is requesting his last and most recent EKG to be faxed to: [phone].    Call back#: [phone]

## 2024-06-12 ENCOUNTER — OFFICE VISIT (OUTPATIENT)
Dept: CARDIOLOGY CLINIC | Facility: CLINIC | Age: 64
End: 2024-06-12
Payer: COMMERCIAL

## 2024-06-12 VITALS
BODY MASS INDEX: 27.92 KG/M2 | OXYGEN SATURATION: 96 % | WEIGHT: 195 LBS | HEART RATE: 64 BPM | RESPIRATION RATE: 16 BRPM | HEIGHT: 70 IN | DIASTOLIC BLOOD PRESSURE: 80 MMHG | SYSTOLIC BLOOD PRESSURE: 138 MMHG

## 2024-06-12 DIAGNOSIS — Z01.810 PREOP CARDIOVASCULAR EXAM: Primary | ICD-10-CM

## 2024-06-12 DIAGNOSIS — I10 ESSENTIAL HYPERTENSION: ICD-10-CM

## 2024-06-12 PROCEDURE — 99213 OFFICE O/P EST LOW 20 MIN: CPT | Performed by: INTERNAL MEDICINE

## 2024-06-12 NOTE — PROGRESS NOTES
CARDIOLOGY OFFICE VISIT  [facility]  [address], [place] [id number]  Tel: [phone]      NAME: Magno Lau Jr.  AGE: 63 y.o.  SEX: male  : 1960  MRN: [id number]      Chief Complaint:  Chief Complaint   Patient presents with    Follow-up         History of Present Illness:   Patient comes for follow up. States he is doing well from cardiac stand point and denies chest pain / pressure, SOB, palpitations, lightheadedness, syncope, swelling feet, orthopnea, PND, claudication.    63-year-old male with hypertension, bilateral knee osteoarthritis here for preoperative cardiac risk assessment prior to bilateral knee surgeries.     Denies history of CAD, CHF, CKD, TIA, CVA, DM  States he can climb 1 flight of steps without any shortness of breath or chest pain    Essential hypertension -  Has been hypertensive for many years.  Taking medications regularly.  Denies lightheadedness, headache, medication side effects.      Past Medical History:  Past Medical History:   Diagnosis Date    Disease of thyroid gland     Essential hypertension     GERD (gastroesophageal reflux disease)     Hyperlipidemia     Hypertension     Hypothyroidism     Thyroid nodule          Family History:  Family History   Problem Relation Age of Onset    Hypertension Mother     Arthritis Father     Hyperlipidemia Father     Hypertension Father     No Known Problems Sister          Social History:  Social History     Socioeconomic History    Marital status: /Civil Union     Spouse name: None    Number of children: None    Years of education: None    Highest education level: None   Occupational History     Comment: Employed   Tobacco Use    Smoking status: Never    Smokeless tobacco: Never   Vaping Use    Vaping status: Never Used   Substance and Sexual Activity    Alcohol use: Yes     Comment: Social    Drug use: No    Sexual activity: Yes      Partners: Female   Other Topics Concern    None   Social History Narrative    Always uses  Seat belt    Daily Caffeine Consumption    Seeing a dentist     Social Determinants of Health     Financial Resource Strain: Not on file   Food Insecurity: Not on file   Transportation Needs: Not on file   Physical Activity: Not on file   Stress: Not on file   Social Connections: Not on file   Intimate Partner Violence: Not on file   Housing Stability: Not on file         Active Problems:  Patient Active Problem List   Diagnosis    Acquired hypothyroidism    Essential hypertension    GERD (gastroesophageal reflux disease)    Mixed hyperlipidemia    Thyroid nodule    Primary osteoarthritis of both knees    Chronic pain of left knee    Primary osteoarthritis of left knee    Radiculopathy, lumbar region         The following portions of the patient's history were reviewed and updated as appropriate: past medical history, past surgical history, past family history,  past social history, current medications, allergies and problem list.      Review of Systems:  Constitutional: Denies fever, chills  Eyes: Denies eye redness, eye discharge  ENT: Denies hearing loss, sneezing, nasal discharge, sore throat   Respiratory: Denies cough, expectoration, shortness of breath  Cardiovascular: Denies chest pain, palpitations, lower extremity swelling  Gastrointestinal: Denies abdominal pain, nausea, vomiting, diarrhea  Genito-Urinary: Denies dysuria, incontinence  Musculoskeletal: +knee pain  Neurologic: Denies lightheadedness, syncope, headache, seizures  Endocrine: Denies polydipsia, temperature intolerance  Allergy and Immunology: Denies hives, insect bite sensitivity  Hematological and Lymphatic: Denies bleeding problems, swollen glands   Psychological: Denies depression, suicidal ideation, anxiety, panic  Dermatological: Denies pruritus, rash, skin lesion changes      Vitals:  Vitals:    06/12/24 1631   BP: 138/80   Pulse: 64   Resp: 16  "  SpO2: 96%       Body mass index is 27.98 kg/m².    Weight (last 2 days)       Date/Time Weight    06/12/24 1631 88.5 (195)              Physical Examination:  General: Patient is not in acute distress. Awake, alert, oriented in time, place and person. Responding to commands  Head: Normocephalic. Atraumatic  Eyes: Both pupils normal sized, round and reactive to light. Nonicteric  ENT: Normal external ear canals  Neck: Supple. JVP not raised. Trachea central. No thyromegaly  Lungs: Bilateral bronchovascular breath sounds with no crackles or rhonchi  Chest wall: No tenderness  Cardiovascular: RRR. S1 and S2 normal. No murmur, rub or gallop  Gastrointestinal: Abdomen soft, nontender. No guarding or rigidity. Liver and spleen not palpable. Bowel sounds present  Neurologic: Patient is awake, alert, oriented in time, place and person. Responding to commands. Moving all extremities  Integumentary:  No skin rash  Lymphatic: No cervical lymphadenopathy  Back: Symmetric. No CVA tenderness  Extremities: No clubbing, cyanosis or edema      Laboratory Results:  CBC with diff:   Lab Results   Component Value Date    WBC 4.75 09/15/2022    WBC 0-5 11/11/2017    RBC 5.44 09/15/2022    HGB 16.6 09/15/2022    HCT 47.4 09/15/2022    MCV 87 09/15/2022    MCH 30.5 09/15/2022    RDW 13.1 09/15/2022     09/15/2022       CMP:  Lab Results   Component Value Date    CREATININE 0.77 09/15/2022    BUN 13 09/15/2022    BUN 15 04/19/2019    K 4.5 09/15/2022    K 4.2 04/19/2019    CL 97 09/15/2022    CL 98 04/19/2019    CO2 31 09/15/2022    CO2 28 04/19/2019    ALKPHOS 67 09/15/2022    ALT 48 09/15/2022    ALT 40 04/19/2019    AST 62 (H) 09/15/2022    AST 34 04/19/2019       Lipid Profile:   No results found for: \"CHOL\"  Lab Results   Component Value Date    HDL 51 04/19/2019    HDL 50 11/26/2018     Lab Results   Component Value Date    LDLCALC 85 04/19/2019    LDLCALC 86 11/26/2018     Lab Results   Component Value Date    TRIG 81 " 04/19/2019    TRIG 92 11/26/2018       Cardiac testing:   Results for orders placed during the hospital encounter of 05/24/24    Echo complete w/ contrast if indicated    Interpretation Summary    Left Ventricle: Left ventricular cavity size is normal. Wall thickness is normal. The left ventricular ejection fraction is 60%. Systolic function is normal. Although no diagnostic regional wall motion abnormality was identified, this possibility cannot be completely excluded on the basis of this study. Diastolic function is normal.    Right Ventricle: Right ventricular cavity size is normal. Systolic function is normal.    Mitral Valve: There is trace regurgitation.    Tricuspid Valve: There is trace regurgitation. The right ventricular systolic pressure is normal.    Pulmonic Valve: There is trace regurgitation.      Medications:    Current Outpatient Medications:     levothyroxine 88 mcg tablet, , Disp: , Rfl:     Multiple Vitamin (MULTIVITAMIN) tablet, Take 1 tablet by mouth daily, Disp: , Rfl:     olmesartan (BENICAR) 20 mg tablet, Take 1 tablet (20 mg total) by mouth daily, Disp: , Rfl:       Allergies:  Allergies   Allergen Reactions    Lisinopril Cough    Penicillins Rash     Per pt was told this when young          Assessment and Plan:  1. Preop cardiovascular exam  Patient has no active cardiac conditions (such as unstable cardiac syndrome, decompensated heart failure, significant arrhythmias, severe valvular disease) and no clinical risk factors (such as CAD, compensated or prior heart failure, diabetes mellitus, renal insufficiency, CVA).  Patient can comfortably go up and down one flight of steps which is 4 METS.  Patient is a low cardiac risk patient going in for a knee surgery.  No further cardiac workup is needed at this time.  No cardiac contraindications for surgery.      2. Essential hypertension  BP stable.  Continue olmesartan 20 mg daily.  Continue to monitor BP at home and call if abnormal.  Low-salt  diet    Recommend aggressive risk factor modification and therapeutic lifestyle changes.  Low-salt, low-calorie, low-fat, low-cholesterol diet with regular exercise and to optimize weight.  I will defer the ordering and monitoring of necessity lab studies to you, but I am available and happy to review and manage any of the data at your request in the future.    Discussed concepts of atherosclerosis, including signs and symptoms of cardiac disease.    Previous studies were reviewed.    Safety measures were reviewed.  Questions were entertained and answered.  Patient was advised to report any problems requiring medical attention.    Follow-up with PCP and appropriate specialist and lab work as discussed.    Return for follow up visit as scheduled or earlier, if needed.  Thank you for allowing me to participate in the care and evaluation of your patient.  Should you have any questions, please feel free to contact me.    Ari Galdamez MD  6/12/2024,4:51 PM

## 2024-07-15 ENCOUNTER — EVALUATION (OUTPATIENT)
Dept: PHYSICAL THERAPY | Facility: CLINIC | Age: 64
End: 2024-07-15
Payer: COMMERCIAL

## 2024-07-15 DIAGNOSIS — Z96.653 STATUS POST TOTAL BILATERAL KNEE REPLACEMENT: Primary | ICD-10-CM

## 2024-07-15 PROCEDURE — 97162 PT EVAL MOD COMPLEX 30 MIN: CPT

## 2024-07-15 PROCEDURE — 97110 THERAPEUTIC EXERCISES: CPT

## 2024-07-15 NOTE — PROGRESS NOTES
PT Evaluation     Today's date: 7/15/2024  Patient name: Magno Lau Jr.  : 1960  MRN: [id number]  Referring provider: Karson Miranda MD  Dx:   Encounter Diagnosis     ICD-10-CM    1. Status post total bilateral knee replacement  Z96.653           Start Time: 845  Stop Time: 930  Total time in clinic (min): 45 minutes    Assessment  Impairments: abnormal gait, abnormal muscle tone, abnormal or restricted ROM, activity intolerance, impaired balance, impaired physical strength, pain with function, poor posture , unable to perform ADL, participation limitations, activity limitations and endurance  Symptom irritability: moderate  Irritability comments: moderate to high    Assessment details: Patient is a pleasant 63 y.o. male who presents to physical therapy following bilateral TKA.    No further referral appears necessary at this time based upon examination results.    Primary movement impairment diagnosis of bilateral hypomobility resulting in pathoanatomical symptoms of decreased ROM, decreased strength, and increased symptom irritability. This limits Suhas's ability to complete Adls, ambulate w/o assistive device, return to work, and return to cycling.     Prognosis is good given HEP compliance and PT 2 times per week over the next 12 weeks.  Positive prognostic indicators include positive attitude toward recovery and adherence to previous HEP.  Please contact me if you have any questions.  Thank you for the opportunity to share in Magno Lau  care.    Understanding of Dx/Px/POC: good     Prognosis: good    Goals  Short term:  Pt will be independent w/ individualized HEP  Pt will have a decrease in symptom irritability by 2 points     Long term:  Pt will be able to cycle 5 mile w/o symptom irritability   Pt will be able to return to work w/o limitations   Pt will be able to walk over a mile w/o symptom irritability   Pt will improve FOTO by MDC      Plan  Patient would benefit from: skilled  physical therapy  Referral necessary: No  Planned modality interventions: neuromuscular electric stimulation    Planned therapy interventions: IASTM, activity modification, joint mobilization, manual therapy, massage, nerve gliding, neuromuscular re-education, patient/caregiver education, postural training, strengthening, stretching, therapeutic activities, therapeutic exercise, therapeutic training, home exercise program, graded exercise, graded activity, gait training, functional ROM exercises, flexibility and balance    Frequency: 2x week  Plan of Care beginning date: 7/15/2024  Plan of Care expiration date: 10/7/2024  Treatment plan discussed with: patient        Subjective Evaluation    History of Present Illness  Date of surgery: 2024  Mechanism of injury: surgery  Mechanism of injury: Pt reports to outpatient PT following bilateral TKA on 24. Pt is one week post op and has been completing a provided HEP 3 times per day focusing on ROM. Pt reports he has been having  trouble sleeping due to the pain, medications help to reduce the symptom irritability. Pt utilizes RW to ambulate in community, Single point cane at home. Pt reports that his R knee pain worse than L, more swelling R knee vs L. Pt reports having R sided calf pain occasionally throughout the day. Currently Pt does not meet Wells DVT criteria, educated Pt on monitoring symptoms. No red flags are present.   Quality of life: fair    Patient Goals  Patient goals for therapy: decreased edema, decreased pain, improved balance, increased motion, increased strength, independence with ADLs/IADLs and return to work  Patient goal: Getting back to work, biking riding  Pain  Current pain ratin  At best pain ratin  At worst pain ratin  Location: Front of knees  Quality: dull ache and tight  Relieving factors: medications, ice, relaxation, rest and change in position  Aggravating factors: walking, sitting and stair climbing  Progression: no  change    Social Support  Steps to enter house: yes  2  Stairs in house: yes   12  Lives in: multiple-level home  Lives with: spouse    Employment status: working ([employer])  Treatments  Previous treatment: injection treatment and medication  Current treatment: medication      Objective     Observations     Additional Observation Details  Bilateral bruising and swelling in knees. R knee more swelling than L  Pt to get dressing off this Friday at follow-up.  Pt reported pain in back of R calf at some points throughout the day, no signs of DVT in BLE. Educated Pt on signs and symptoms of DVT and to go to ER if any of the symptoms start or his calf pain gets worse.         Palpation   Left   No palpable tenderness to the lateral gastrocnemius and medial gastrocnemius.     Right   No palpable tenderness to the lateral gastrocnemius and medial gastrocnemius.     Active Range of Motion   Left Knee   Flexion: 65 degrees   Extension: 4 degrees     Right Knee   Flexion: 65 degrees   Extension: 6 degrees     Passive Range of Motion   Left Knee   Flexion: 84 degrees   Extension: 0 degrees     Right Knee   Flexion: 82 degrees   Extension: 0 degrees     Patellar Static Positioning   Left Knee: WFL  Right Knee: WFL    Strength/Myotome Testing     Left Knee   Quadriceps contraction: fair    Right Knee   Quadriceps contraction: fair    Additional Strength Details  Flexion MMT limited by hypomobility   Pt able to complete SLR w/ moderate quad lag             Precautions: High BP, History of LBP    POC expires Unit limit Auth Expiration date PT/OT/ST + Visit Limit?   10/7/24 3 PEND 20                           Visit/Unit Tracking  AUTH Status:  Date 7/15              PEND Used 1               Remaining                    HEP: [id number]   Manuals 7/15                                                                Neuro Re-Ed                                                                                                         Ther Ex             HEP education 10'                                                                                                       Ther Activity                                       Gait Training                                       Modalities

## 2024-07-15 NOTE — LETTER
2024    Karson Miranda MD  [address] [facility] [address]    Patient: Magno Lau Jr.   YOB: 1960   Date of Visit: 7/15/2024     Encounter Diagnosis     ICD-10-CM    1. Status post total bilateral knee replacement  Z96.653           Dear Dr. Miranda:    Thank you for your recent referral of Magno Lau Jr.. Please review the attached evaluation summary from Magno's recent visit.     Please verify that you agree with the plan of care by signing the attached order.     If you have any questions or concerns, please do not hesitate to call.     I sincerely appreciate the opportunity to share in the care of one of your patients and hope to have another opportunity to work with you in the near future.       Sincerely,    Sylvester Martinez, PT      Referring Provider:      I certify that I have read the below Plan of Care and certify the need for these services furnished under this plan of treatment while under my care.                    Karson Miranda MD  [address] [facility] [address]  Via Fax: [phone]          PT Evaluation     Today's date: 7/15/2024  Patient name: Magno Lau Jr.  : 1960  MRN: [id number]  Referring provider: Karson Miranda MD  Dx:   Encounter Diagnosis     ICD-10-CM    1. Status post total bilateral knee replacement  Z96.653           Start Time: 845  Stop Time: 930  Total time in clinic (min): 45 minutes    Assessment  Impairments: abnormal gait, abnormal muscle tone, abnormal or restricted ROM, activity intolerance, impaired balance, impaired physical strength, pain with function, poor posture , unable to perform ADL, participation limitations, activity limitations and endurance  Symptom irritability: moderate  Irritability comments: moderate to high    Assessment details: Patient is a pleasant 63 y.o. male who presents to physical therapy following bilateral TKA.    No further referral appears necessary at this time based  upon examination results.    Primary movement impairment diagnosis of bilateral hypomobility resulting in pathoanatomical symptoms of decreased ROM, decreased strength, and increased symptom irritability. This limits Suhas's ability to complete Adls, ambulate w/o assistive device, return to work, and return to cycling.     Prognosis is good given HEP compliance and PT 2 times per week over the next 12 weeks.  Positive prognostic indicators include positive attitude toward recovery and adherence to previous HEP.  Please contact me if you have any questions.  Thank you for the opportunity to share in Magno Lau Jr. care.    Understanding of Dx/Px/POC: good     Prognosis: good    Goals  Short term:  Pt will be independent w/ individualized HEP  Pt will have a decrease in symptom irritability by 2 points     Long term:  Pt will be able to cycle 5 mile w/o symptom irritability   Pt will be able to return to work w/o limitations   Pt will be able to walk over a mile w/o symptom irritability   Pt will improve FOTO by MDC      Plan  Patient would benefit from: skilled physical therapy  Referral necessary: No  Planned modality interventions: neuromuscular electric stimulation    Planned therapy interventions: IASTM, activity modification, joint mobilization, manual therapy, massage, nerve gliding, neuromuscular re-education, patient/caregiver education, postural training, strengthening, stretching, therapeutic activities, therapeutic exercise, therapeutic training, home exercise program, graded exercise, graded activity, gait training, functional ROM exercises, flexibility and balance    Frequency: 2x week  Plan of Care beginning date: 7/15/2024  Plan of Care expiration date: 10/7/2024  Treatment plan discussed with: patient        Subjective Evaluation    History of Present Illness  Date of surgery: 7/8/2024  Mechanism of injury: surgery  Mechanism of injury: Pt reports to outpatient PT following bilateral TKA on  24. Pt is one week post op and has been completing a provided HEP 3 times per day focusing on ROM. Pt reports he has been having  trouble sleeping due to the pain, medications help to reduce the symptom irritability. Pt utilizes RW to ambulate in community, Single point cane at home. Pt reports that his R knee pain worse than L, more swelling R knee vs L. Pt reports having R sided calf pain occasionally throughout the day. Currently Pt does not meet Wells DVT criteria, educated Pt on monitoring symptoms. No red flags are present.   Quality of life: fair    Patient Goals  Patient goals for therapy: decreased edema, decreased pain, improved balance, increased motion, increased strength, independence with ADLs/IADLs and return to work  Patient goal: Getting back to work, biking riding  Pain  Current pain ratin  At best pain ratin  At worst pain ratin  Location: Front of knees  Quality: dull ache and tight  Relieving factors: medications, ice, relaxation, rest and change in position  Aggravating factors: walking, sitting and stair climbing  Progression: no change    Social Support  Steps to enter house: yes  2  Stairs in house: yes   12  Lives in: multiple-level home  Lives with: spouse    Employment status: working ([employer])  Treatments  Previous treatment: injection treatment and medication  Current treatment: medication      Objective     Observations     Additional Observation Details  Bilateral bruising and swelling in knees. R knee more swelling than L  Pt to get dressing off this Friday at follow-up.  Pt reported pain in back of R calf at some points throughout the day, no signs of DVT in BLE. Educated Pt on signs and symptoms of DVT and to go to ER if any of the symptoms start or his calf pain gets worse.         Palpation   Left   No palpable tenderness to the lateral gastrocnemius and medial gastrocnemius.     Right   No palpable tenderness to the lateral gastrocnemius and medial  gastrocnemius.     Active Range of Motion   Left Knee   Flexion: 65 degrees   Extension: 4 degrees     Right Knee   Flexion: 65 degrees   Extension: 6 degrees     Passive Range of Motion   Left Knee   Flexion: 84 degrees   Extension: 0 degrees     Right Knee   Flexion: 82 degrees   Extension: 0 degrees     Patellar Static Positioning   Left Knee: WFL  Right Knee: WFL    Strength/Myotome Testing     Left Knee   Quadriceps contraction: fair    Right Knee   Quadriceps contraction: fair    Additional Strength Details  Flexion MMT limited by hypomobility   Pt able to complete SLR w/ moderate quad lag             Precautions: High BP, History of LBP    POC expires Unit limit Auth Expiration date PT/OT/ST + Visit Limit?   10/7/24 3 PEND 20                           Visit/Unit Tracking  AUTH Status:  Date 7/15              PEND Used 1               Remaining                    HEP: [id number]   Manuals 7/15                                                                Neuro Re-Ed                                                                                                        Ther Ex             HEP education 10'                                                                                                       Ther Activity                                       Gait Training                                       Modalities

## 2024-07-22 ENCOUNTER — OFFICE VISIT (OUTPATIENT)
Dept: PHYSICAL THERAPY | Facility: CLINIC | Age: 64
End: 2024-07-22
Payer: COMMERCIAL

## 2024-07-22 DIAGNOSIS — Z96.653 STATUS POST TOTAL BILATERAL KNEE REPLACEMENT: Primary | ICD-10-CM

## 2024-07-22 PROCEDURE — 97112 NEUROMUSCULAR REEDUCATION: CPT

## 2024-07-22 PROCEDURE — 97110 THERAPEUTIC EXERCISES: CPT

## 2024-07-22 NOTE — PROGRESS NOTES
"Daily Note     Today's date: 2024  Patient name: Magno Lau Jr.  : 1960  MRN: [id number]  Referring provider: Karson Miranda MD  Dx:   Encounter Diagnosis     ICD-10-CM    1. Status post total bilateral knee replacement  Z96.653           Start Time: 1800  Stop Time: 1845  Total time in clinic (min): 45 minutes    Subjective: - Pt states he has been compliant w. All his HEP but is unable to do the STS his surgeon had suggested he try.       Objective: See treatment diary below    Flexion AROM   - R 85 , L 88      Assessment: Pt showed increase in flexion AROM during heel slides. Pt responded well to increase in quadriceps strengthening w/ LAQ demonstrating minimal extensor lag. He continues to struggle w. Terminal knee extension. Tolerated treatment well. Patient demonstrated fatigue post treatment and would benefit from continued PT      Plan: Continue per plan of care.      Precautions: High BP, History of LBP    POC expires Unit limit Auth Expiration date PT/OT/ST + Visit Limit?   10/7/24 3 PEND 20                           Visit/Unit Tracking  AUTH Status:  Date 7/15 7/22             PEND Used 1 2              Remaining                    HEP: [id number]   Manuals 7/15 7/22           Trigger point release to quads  5' AL                                                  Neuro Re-Ed                                                                                                        Ther Ex             HEP education 10'            SLR  3x10           Standing add  2x12 GTB           LAQ   3x 5 10\" hold           HS curl  2x15                                                  Ther Activity             Mini Squat  2x10  30*                         Gait Training                                       Modalities                                            " Normal

## 2024-07-24 ENCOUNTER — OFFICE VISIT (OUTPATIENT)
Dept: PHYSICAL THERAPY | Facility: CLINIC | Age: 64
End: 2024-07-24
Payer: COMMERCIAL

## 2024-07-24 DIAGNOSIS — Z96.653 STATUS POST TOTAL BILATERAL KNEE REPLACEMENT: Primary | ICD-10-CM

## 2024-07-24 PROCEDURE — 97112 NEUROMUSCULAR REEDUCATION: CPT

## 2024-07-24 PROCEDURE — 97110 THERAPEUTIC EXERCISES: CPT

## 2024-07-24 NOTE — PROGRESS NOTES
"Daily Note     Today's date: 2024  Patient name: Magno Lau Jr.  : 1960  MRN: [id number]  Referring provider: Karson Miranda MD  Dx:   Encounter Diagnosis     ICD-10-CM    1. Status post total bilateral knee replacement  Z96.653         Start Time: 174  Stop Time: 1832  Total time in clinic (min): 48 minutes    Subjective: Pt reports he has been completing his HEP multiple times a day. Pt has been practicing his squats at home. Pt ambulates w/ cane sometimes throughout the day.       Objective: See treatment diary below    Flexion AROM   - R 102 , L 94      Assessment: Tolerated treatment well. Added recumbent bike to program. Pt able to complete 6 minutes, w/o onset of knee pain, educated Pt on utilizing his stationary bike at home. Pt did well w/ new exercises, required minimal cueing for bridging. Patient demonstrated fatigue post treatment, exhibited good technique with therapeutic exercises, and would benefit from continued PT for increased ROM, increased strength, increased balance, and decreased symptom irritability.       Plan: Continue per plan of care.      Precautions: High BP, History of LBP    POC expires Unit limit Auth Expiration date PT/OT/ST + Visit Limit?   10/7/24 3 PEND 20                           Visit/Unit Tracking  AUTH Status:  Date 7/15 7/22 7/24            PEND Used 1 2 3             Remaining                    HEP: [id number]   Manuals 7/15 7/22 7/24          Trigger point release to quads  5' AL           Patellar mobs   NV                                    Neuro Re-Ed             Side lying hip abduction   3x10 ea          Bridge   3x10 3\"                                                                           Ther Ex             HEP education 10'            SLR  3x10 3x10          Standing add  2x12 GTB           LAQ   3x 5 10\" hold X20 ea 5\"          HS curl  2x15 2x15          Bike   8'                                    Ther Activity             Mini Squat  " 2x10  30*                         Gait Training                                       Modalities

## 2024-07-29 ENCOUNTER — OFFICE VISIT (OUTPATIENT)
Dept: PHYSICAL THERAPY | Facility: CLINIC | Age: 64
End: 2024-07-29
Payer: COMMERCIAL

## 2024-07-29 DIAGNOSIS — Z96.653 STATUS POST TOTAL BILATERAL KNEE REPLACEMENT: Primary | ICD-10-CM

## 2024-07-29 PROCEDURE — 97110 THERAPEUTIC EXERCISES: CPT

## 2024-07-29 PROCEDURE — 97112 NEUROMUSCULAR REEDUCATION: CPT

## 2024-07-29 NOTE — PROGRESS NOTES
"Daily Note     Today's date: 2024  Patient name: Magno Lau Jr.  : 1960  MRN: [id number]  Referring provider: Karson Miranda MD  Dx:   Encounter Diagnosis     ICD-10-CM    1. Status post total bilateral knee replacement  Z96.653           Start Time: 1750  Stop Time: 1835  Total time in clinic (min): 45 minutes    Subjective: Pt reports his HEP has been going well and that he had a good weekend. Pt reports minor pain on the lateral aspect of his L knee.       Objective: See treatment diary below    Flexion AROM   - R 112 , L 102      Assessment: Tolerated treatment well. Pt improved 10 plus degrees on bilateral knee flexion, L knee more limited compared to R. Pt patella mobility is WFL bilaterally, Pt reported mild pain with lateral patella mobs on L knee. Pt able to complete eccentric step downs w/ heavy difficulty and UE assist, educated Pt on practicing SL stance and eccentric taps to help progress stair negotiation. Pt unable to perform STS or squat past 60 degrees knee flexion. Decreased frequency to 1 time per week w/ HEP. Patient demonstrated fatigue post treatment, exhibited good technique with therapeutic exercises, and would benefit from continued PT for increased ROM, increased strength, increased balance, and decreased symptom irritability.       Plan: Continue per plan of care.      Precautions: High BP, History of LBP    POC expires Unit limit Auth Expiration date PT/OT/ST + Visit Limit?   10/7/24 3 24 20                           Visit/Unit Tracking  AUTH Status:  Date 7/15 7/22 7/24 7/29           12 Used 1 2 3 4            Remaining                    HEP: [id number]   Manuals 7/15 7/22 7/24 7/29         Trigger point release to quads  5' AL           Patellar mobs   NV JMK                                   Neuro Re-Ed             Side lying hip abduction   3x10 ea 3x10 ea         Bridge   3x10 3\" 3x10 3\"         Eccentric step downs    3x10 ea 4\"                           " "                                  Ther Ex             HEP education 10'            SLR  3x10 3x10 4x10         Standing add  2x12 GTB           LAQ   3x 5 10\" hold X20 ea 5\"          HS curl  2x15 2x15 2x15         Bike   8' 10'         Squat    2 foam pad low mat x15                      Ther Activity             Mini Squat  2x10  30*                         Gait Training                                       Modalities                                            "

## 2024-07-31 ENCOUNTER — APPOINTMENT (OUTPATIENT)
Dept: PHYSICAL THERAPY | Facility: CLINIC | Age: 64
End: 2024-07-31
Payer: COMMERCIAL

## 2024-08-05 ENCOUNTER — OFFICE VISIT (OUTPATIENT)
Dept: PHYSICAL THERAPY | Facility: CLINIC | Age: 64
End: 2024-08-05
Payer: COMMERCIAL

## 2024-08-05 DIAGNOSIS — Z96.653 STATUS POST TOTAL BILATERAL KNEE REPLACEMENT: Primary | ICD-10-CM

## 2024-08-05 PROCEDURE — 97110 THERAPEUTIC EXERCISES: CPT

## 2024-08-05 PROCEDURE — 97112 NEUROMUSCULAR REEDUCATION: CPT

## 2024-08-05 NOTE — PROGRESS NOTES
Daily Note     Today's date: 2024  Patient name: Magno Lau Jr.  : 1960  MRN: [id number]  Referring provider: Karson Miranda MD  Dx:   Encounter Diagnosis     ICD-10-CM    1. Status post total bilateral knee replacement  Z96.653         Start Time: 175  Stop Time: 1833  Total time in clinic (min): 42 minutes    Subjective: Pt reports that he has been completing his HEP w/ ficus on eccentric step downs. Pt reports having more control on the steps, but is having more soreness in the quad tendon area. Pt reports that he has R foot pain and has an appointment to get checked this week.       Objective: See treatment diary below    Flexion AROM   - R 120 , L 115      Assessment: Tolerated treatment well. Pt had another good increase in knee flexion AROM bilaterally today. Pt demonstrated more eccentric control w/ step downs today and he required minimal UE support during the exercise. Pt was able to perform STS w/ one foam demonstrating more strength through a more functional ROM. Added leg press to further challenge Pt, enjoyed machine and required minimal cueing. Pt has returned to driving w/o limitation. Patient demonstrated fatigue post treatment, exhibited good technique with therapeutic exercises, and would benefit from continued PT for increased ROM, increased strength, increased balance, and decreased symptom irritability.       Plan: Continue per plan of care.      Precautions: High BP, History of LBP    POC expires Unit limit Auth Expiration date PT/OT/ST + Visit Limit?   10/7/24 3 24 20                           Visit/Unit Tracking  AUTH Status:  Date 7/15 7/22 7/24 7/29           12 Used 1 2 3 4            Remaining                    HEP: [id number]   Manuals 7/15 7/22 7/24 7/29 8/5        Trigger point release to quads  5' AL           Patellar mobs   NV JMK                                   Neuro Re-Ed             Side lying hip abduction   3x10 ea 3x10 ea 3x10 ea 2#        Bridge  "  3x10 3\" 3x10 3\" 3x10 3\"        Eccentric step downs    3x10 ea 4\" 3x10 ea 4\"                                                            Ther Ex             HEP education 10'            SLR  3x10 3x10 4x10 3x10 2#        Standing add  2x12 GTB           LAQ   3x 5 10\" hold X20 ea 5\"          HS curl  2x15 2x15 2x15 2x15 ea        Bike   8' 10' 8'        Squat    2 foam pad low mat x15 1 foam pad low mat 2x10        Leg press     BL 3x10 85#  SL 45# x15        Ther Activity             Mini Squat  2x10  30*                         Gait Training                                       Modalities                                            "

## 2024-08-07 ENCOUNTER — APPOINTMENT (OUTPATIENT)
Dept: PHYSICAL THERAPY | Facility: CLINIC | Age: 64
End: 2024-08-07
Payer: COMMERCIAL

## 2024-08-07 ENCOUNTER — TELEPHONE (OUTPATIENT)
Age: 64
End: 2024-08-07

## 2024-08-12 ENCOUNTER — OFFICE VISIT (OUTPATIENT)
Dept: PHYSICAL THERAPY | Facility: CLINIC | Age: 64
End: 2024-08-12
Payer: COMMERCIAL

## 2024-08-12 DIAGNOSIS — Z96.653 STATUS POST TOTAL BILATERAL KNEE REPLACEMENT: Primary | ICD-10-CM

## 2024-08-12 PROCEDURE — 97110 THERAPEUTIC EXERCISES: CPT

## 2024-08-12 PROCEDURE — 97112 NEUROMUSCULAR REEDUCATION: CPT

## 2024-08-12 NOTE — PROGRESS NOTES
Daily Note     Today's date: 2024  Patient name: Magno Lau Jr.  : 1960  MRN: [id number]  Referring provider: Karson Miranda MD  Dx:   Encounter Diagnosis     ICD-10-CM    1. Status post total bilateral knee replacement  Z96.653         Start Time: 1750  Stop Time: 1830  Total time in clinic (min): 40 minutes    Subjective: Pt reports that his HEP updated and does not feel challenged by new exercises. Pt reports he feels function and has minimal symptom irritability.       Objective: See treatment diary below    Flexion AROM   - R 120 , L 115      Assessment: Tolerated treatment well. Provided Pt w/ GTB and BTB to further progress HEP and to have Pt challenge himself at home. Educated Pt on when to use the Thera bands and what resistance to start with. No major changes in knee flexion ROM compared to last visit, reeducated the importance of ROM early on in recovery process. Pt did well w/ all exercises, no increase in symptom irritability. Pt able to complete stair negotiation w/ step over step pattern and minimal UE assist. Patient demonstrated fatigue post treatment, exhibited good technique with therapeutic exercises, and would benefit from continued PT for increased ROM, increased strength, increased balance, and decreased symptom irritability.     Goals  Short term:  Pt will be independent w/ individualized HEP- MET  Pt will have a decrease in symptom irritability by 2 points- MET    Plan: Continue per plan of care.      Precautions: High BP, History of LBP    POC expires Unit limit Auth Expiration date PT/OT/ST + Visit Limit?   10/7/24 3 24 20                           Visit/Unit Tracking  AUTH Status:  Date 7/15 7/22 7/24 7/29 8/5 8/12         12 Used 1 2 3 4 5 6          Remaining                    HEP: [id number]   Manuals 7/15 7/22 7/24 7/29 8/5 8/12       Trigger point release to quads  5' AL           Patellar mobs   NV JMK                                   Neuro Re-Ed          "    Side lying hip abduction   3x10 ea 3x10 ea 3x10 ea 2# 3x10 ea 3#       Bridge   3x10 3\" 3x10 3\" 3x10 3\" 3x10 3\"       Eccentric step downs    3x10 ea 4\" 3x10 ea 4\" 2x10 ea 6\"                                                           Ther Ex             HEP education 10'            SLR  3x10 3x10 4x10 3x10 2# 3x10 ea 3#       Standing add  2x12 GTB           LAQ   3x 5 10\" hold X20 ea 5\"          HS curl  2x15 2x15 2x15 2x15 ea 3x10 ea 3#       Bike   8' 10' 8' 6'       Squat    2 foam pad low mat x15 1 foam pad low mat 2x10 1 foam pad low mat 3x10       Leg press     BL 3x10 85#  SL 45# x15 BL 3x10 95#  SL 45# x15       Ther Activity             Mini Squat  2x10  30*                         Gait Training                                       Modalities                                            "

## 2024-08-14 ENCOUNTER — APPOINTMENT (OUTPATIENT)
Dept: PHYSICAL THERAPY | Facility: CLINIC | Age: 64
End: 2024-08-14
Payer: COMMERCIAL

## 2024-08-19 ENCOUNTER — OFFICE VISIT (OUTPATIENT)
Dept: PHYSICAL THERAPY | Facility: CLINIC | Age: 64
End: 2024-08-19
Payer: COMMERCIAL

## 2024-08-19 DIAGNOSIS — Z96.653 STATUS POST TOTAL BILATERAL KNEE REPLACEMENT: Primary | ICD-10-CM

## 2024-08-19 PROCEDURE — 97112 NEUROMUSCULAR REEDUCATION: CPT

## 2024-08-19 PROCEDURE — 97110 THERAPEUTIC EXERCISES: CPT

## 2024-08-19 NOTE — PROGRESS NOTES
"Daily Note     Today's date: 2024  Patient name: Magno Lau Jr.  : 1960  MRN: [id number]  Referring provider: Karson Miranda MD  Dx:   Encounter Diagnosis     ICD-10-CM    1. Status post total bilateral knee replacement  Z96.653           Start Time: 1622  Stop Time: 1704  Total time in clinic (min): 42 minutes    Subjective: Pt reports that he has been feeling great and that he continues to improve each day. Pt reports no issues w/ new HEP provided at last visit.       Objective: See treatment diary below    Flexion AROM   - R 120 , L 115    Assessment: Tolerated treatment well. No changes in BL knee ROM today. Increased resistance for table exercises and upgraded to SL bridges to further challenge the Pt. LLE weaker and less ROM compared to RLE. Pt has more trouble w/ eccentric control in L knee compared to R as well. Pt did well w/ leg press, completed reps till failure for bilateral. Patient demonstrated fatigue post treatment, exhibited good technique with therapeutic exercises, and would benefit from continued PT for increased ROM, increased strength, increased balance, and decreased symptom irritability.       Plan: Continue per plan of care.      Precautions: High BP, History of LBP    POC expires Unit limit Auth Expiration date PT/OT/ST + Visit Limit?   10/7/24 3 24 20                           Visit/Unit Tracking  AUTH Status:  Date 7/15 7/22 7/24 7/29 8/5 8/12 8/19        12 Used 1 2 3 4 5 6 7         Remaining                    HEP: [id number]   Manuals 7/15 7/22 7/24 7/29 8/5 8/12 8/19      Trigger point release to quads  5' AL           Patellar mobs   NV JMK                                   Neuro Re-Ed             Side lying hip abduction   3x10 ea 3x10 ea 3x10 ea 2# 3x10 ea 3# 3x10 ea 4#      Bridge   3x10 3\" 3x10 3\" 3x10 3\" 3x10 3\"       SL bridge       3x10 ea      Eccentric step downs    3x10 ea 4\" 3x10 ea 4\" 2x10 ea 6\" 3x10 ea 6\"                                        " "                  Ther Ex             HEP education 10'            SLR  3x10 3x10 4x10 3x10 2# 3x10 ea 3# 3x10 ea 4#      Standing add  2x12 GTB           LAQ   3x 5 10\" hold X20 ea 5\"    3x10 ea 4#      HS curl  2x15 2x15 2x15 2x15 ea 3x10 ea 3# 3x10 ea 4#      Bike   8' 10' 8' 6' 6'      Squat    2 foam pad low mat x15 1 foam pad low mat 2x10 1 foam pad low mat 3x10 3x10 low mat      Leg press     BL 3x10 85#  SL 45# x15 BL 3x10 95#  SL 45# x15 BL 3x10 115#  SL 55# x15      Ther Activity             Mini Squat  2x10  30*                         Gait Training                                       Modalities                                            "

## 2024-08-21 ENCOUNTER — APPOINTMENT (OUTPATIENT)
Dept: PHYSICAL THERAPY | Facility: CLINIC | Age: 64
End: 2024-08-21
Payer: COMMERCIAL

## 2024-08-26 ENCOUNTER — APPOINTMENT (OUTPATIENT)
Dept: PHYSICAL THERAPY | Facility: CLINIC | Age: 64
End: 2024-08-26
Payer: COMMERCIAL

## 2024-08-28 ENCOUNTER — APPOINTMENT (OUTPATIENT)
Dept: PHYSICAL THERAPY | Facility: CLINIC | Age: 64
End: 2024-08-28
Payer: COMMERCIAL

## 2024-09-16 ENCOUNTER — OFFICE VISIT (OUTPATIENT)
Dept: PHYSICAL THERAPY | Facility: CLINIC | Age: 64
End: 2024-09-16
Payer: COMMERCIAL

## 2024-09-16 DIAGNOSIS — Z96.653 STATUS POST TOTAL BILATERAL KNEE REPLACEMENT: Primary | ICD-10-CM

## 2024-09-16 PROCEDURE — 97112 NEUROMUSCULAR REEDUCATION: CPT

## 2024-09-16 PROCEDURE — 97110 THERAPEUTIC EXERCISES: CPT

## 2024-09-16 NOTE — PROGRESS NOTES
Daily Note     Today's date: 2024  Patient name: Magno Lau Jr.  : 1960  MRN: [id number]  Referring provider: Karson Miranda MD  Dx:   Encounter Diagnosis     ICD-10-CM    1. Status post total bilateral knee replacement  Z96.653         Start Time: 1753  Stop Time: 183  Total time in clinic (min): 39 minutes    Subjective: Pt reports that he was very consistent w/ HEP until work started. Since work Pt has been fatigue and unable to complete Hep as often. Pt reported that his low back was bothersome for a few days, but is now feeling better.        Objective: See treatment diary below    Flexion AROM   - R 120 , L 115    FOTO score increase to 85     Assessment: Tolerated treatment well. Pt has no difficulty ascending or descending stairs w/ step over step pattern. Pt FOTOI passed MDC and all goals for PT have been Met. Possible DC in 30 days. Updated HEP and educated Pt on new exercises. Patient demonstrated fatigue post treatment, exhibited good technique with therapeutic exercises, and would benefit from continued PT for increased ROM, increased strength, increased balance, and decreased symptom irritability.     Goals  Short term:  Pt will be independent w/ individualized HEP- MET  Pt will have a decrease in symptom irritability by 2 points- MET    Long term:  Pt will be able to cycle 5 mile w/o symptom irritability- MET  Pt will be able to return to work w/o limitations- MET  Pt will be able to walk over a mile w/o symptom irritability- MET  Pt will improve FOTO by MDC- MET    Plan: Continue per plan of care.      Precautions: High BP, History of LBP    POC expires Unit limit Auth Expiration date PT/OT/ST + Visit Limit?   10/7/24 3 24 20                           Visit/Unit Tracking  AUTH Status:  Date 7/15 7/22 7/24 7/29 8       12 Used 1 2 3 4 5 6 7 8        Remaining                    HEP: [id number]   Manuals 7/15 7/22 7/24 7/29 8     Trigger  "point release to quads  5' AL           Patellar mobs   NV JMK                                   Neuro Re-Ed             Side lying hip abduction   3x10 ea 3x10 ea 3x10 ea 2# 3x10 ea 3# 3x10 ea 4#      Bridge   3x10 3\" 3x10 3\" 3x10 3\" 3x10 3\"       SL bridge       3x10 ea 3x10 ea     Eccentric step downs    3x10 ea 4\" 3x10 ea 4\" 2x10 ea 6\" 3x10 ea 6\" 3x10 ea 6\"                                                         Ther Ex             HEP education 10'       10'     SLR  3x10 3x10 4x10 3x10 2# 3x10 ea 3# 3x10 ea 4#      Standing add  2x12 GTB           LAQ   3x 5 10\" hold X20 ea 5\"    3x10 ea 4#      HS curl  2x15 2x15 2x15 2x15 ea 3x10 ea 3# 3x10 ea 4#      Bike   8' 10' 8' 6' 6' 6'     Squat    2 foam pad low mat x15 1 foam pad low mat 2x10 1 foam pad low mat 3x10 3x10 low mat 3x10 15#     Leg press     BL 3x10 85#  SL 45# x15 BL 3x10 95#  SL 45# x15 BL 3x10 115#  SL 55# x15      Forward Lunge         2x10 ea.      Ther Activity             Mini Squat  2x10  30*                         Gait Training                                       Modalities                                            "

## 2025-05-15 ENCOUNTER — HOSPITAL ENCOUNTER (OUTPATIENT)
Dept: ULTRASOUND IMAGING | Facility: HOSPITAL | Age: 65
End: 2025-05-15
Payer: COMMERCIAL

## 2025-05-15 DIAGNOSIS — E04.9 NONTOXIC GOITER: ICD-10-CM

## 2025-05-15 PROCEDURE — 76536 US EXAM OF HEAD AND NECK: CPT
